# Patient Record
Sex: FEMALE | Employment: PART TIME | URBAN - METROPOLITAN AREA
[De-identification: names, ages, dates, MRNs, and addresses within clinical notes are randomized per-mention and may not be internally consistent; named-entity substitution may affect disease eponyms.]

---

## 2017-02-08 ENCOUNTER — HOSPITAL ENCOUNTER (OUTPATIENT)
Dept: PHYSICAL THERAPY | Age: 47
Discharge: HOME | End: 2017-02-08
Payer: COMMERCIAL

## 2017-02-08 PROCEDURE — 97162 PT EVAL MOD COMPLEX 30 MIN: CPT | Performed by: PHYSICAL THERAPIST

## 2017-02-08 PROCEDURE — 97530 THERAPEUTIC ACTIVITIES: CPT | Performed by: PHYSICAL THERAPIST

## 2017-02-08 NOTE — PROGRESS NOTES
Colette Crow [address] [facility] [facility]  [facility]  [address] [address] ,[address], [address]  Phone: [phone]  Fax: [phone] / [address]  Patient Name: Rommel London : 1970   Medical   Diagnosis: Right-sided low back pain with left-sided sciatica [M54.42] Treatment Diagnosis: LBP with L LE Radiculopathy   Onset Date: 17     Referral Source: More [facility]): 2017   Prior Hospitalization: See medical history Provider #: [id number]   Prior Level of Function: Able to sit at work and do Yoga/Pilates classes without symptoms   Comorbidities: H/o thyroid dysfunction and long h/o recurrent L Hip Pain   Medications: Verified on Patient Summary List   The Plan of Care and following information is based on the information from the initial evaluation.   ===========================================================================================  Assessment / key information:  41 y/o female presents to PT with L LE pain/paraesthesias since injury while carrying grocery bags 2 weeks ago. She reports developing severe left leg pain which limited her ability to bear weight on L LE. A prednisone dose-pack was started 2 days ago and her pain has reduced significantly to 2/10. Her current c/o in numbness into left LE down to her foot. Pt stands with decreased spinal curves in the sagittal plane. She has pain with end range sidebending and extension without any reduction in numbness with repeated assessment. LE strength was WNL, except some left hip flexion and DF showing mild weakness. She has nerve tension symptoms with left SLR, with minimal ROM loss. Pt was only able to achieve relief of LE symptoms with positional distraction of left lumbar spine.   Signs and symptoms suggest resolving left lumbar radiculopathy.  ===========================================================================================  Eval Complexity: History MEDIUM  Complexity : 1-2 comorbidities / personal factors will impact the outcome/ POC ;  Examination  MEDIUM Complexity : 3 Standardized tests and measures addressing body structure, function, activity limitation and / or participation in recreation ; Presentation MEDIUM Complexity : Evolving with changing characteristics ; Decision Making MEDIUM Complexity : FOTO score of 26-74; Overall Complexity MEDIUM  Problem List: pain affecting function, decrease ROM, decrease strength, impaired gait/ balance, decrease ADL/ functional abilitiies, decrease activity tolerance, decrease flexibility/ joint mobility and decrease transfer abilities   Treatment Plan may include any combination of the following: Therapeutic exercise, Therapeutic activities, Neuromuscular re-education, Physical agent/modality, Gait/balance training, Manual therapy, Dry Needling, Aquatic therapy, Patient education, Self Care training, Functional mobility training, Home safety training and Stair training  Patient / Family readiness to learn indicated by: asking questions, trying to perform skills and interest  Persons(s) to be included in education: patient (P)  Barriers to Learning/Limitations: None  Measures taken:    Patient Goal (s): \"strength back/hip/abs to avoid another tear in L5\"   Patient self reported health status: good  Rehabilitation Potential: good   Short Term Goals: To be accomplished in  2  weeks:  1. Pt independent with sitting posture and body mechanics for lifting. 2. Pt able to manage symptoms into left LE with HEP/postural correction.  Long Term Goals: To be accomplished in  6  weeks:  1. Pt to increase FOTO score from 59 to >/= 77.  2. Pt independent with high level HEP for body mechanics and spinal stabilization exercises.   3. Pt to demonstrate full, painfree AROM of her lumbar spine to allow return to all activities. Frequency / Duration:   Patient to be seen  2  times per week for 6  weeks:  Patient / Caregiver education and instruction: self care, activity modification, brace/ splint application and exercises  G-Codes (GP): jeremy  Therapist Signature: Annetta Parker PT Date: 8/0/5516   Certification Period: na Time: 10:22 AM   ===========================================================================================  I certify that the above Physical Therapy Services are being furnished while the patient is under my care. I agree with the treatment plan and certify that this therapy is necessary. Physician Signature:        Date:       Time:     Please sign and return to [facility] or you may fax the signed copy to [phone]. Thank you.

## 2017-02-08 NOTE — PROGRESS NOTES
PHYSICAL THERAPY - DAILY TREATMENT NOTE    Patient Name: Jovan Vo        Date: 2017  : 1970   YES Patient  Verified  Visit #:     Insurance: Payor: Yenifer Obregon / Plan: [email or web address] [facility] / Product Type: PPO /      In time: 10:00 Out time: 11:05   Total Treatment Time: 55     Medicare Time Tracking (below)   Total Timed Codes (min):  na 1:1 Treatment Time:  na     TREATMENT AREA =  Back - L LE Pain    SUBJECTIVE  Pain Level (on 0 to 10 scale):  2  / 10   Medication Changes/New allergies or changes in medical history, any new surgeries or procedures? NO    If yes, update Summary List   Subjective Functional Status/Changes:  []  No changes reported     See eval/POC          OBJECTIVE  Modalities Rationale:     decrease pain and increase tissue extensibility to improve patient's ability to prevent soreness  15 min [x] Estim, type/location: IFC to left lower back - supine after treatment                                     []  att     [x]  unatt     []  w/US     []  w/ice    [x]  w/heat    min []  Mechanical Traction: type/lbs                   []  pro   []  sup   []  int   []  cont    []  before manual    []  after manual    min []  Ultrasound, settings/location:      min []  Iontophoresis w/ dexamethasone, location:                                               []  take home patch       []  in clinic    min []  Ice     []  Heat    location/position:     min []  Vasopneumatic Device, press/temp:     min []  Other:    [] Skin assessment post-treatment (if applicable):    []  intact    []  redness- no adverse reaction     []redness  adverse reaction:          10 min Therapeutic Activity: Review of sitting posture and avoidance of spinal flexion to allow healing. Pt has a lumbar soft corset at [facility] and she was encouraged to wear to help with maintaining neutral spine.         min Neuromuscular Re-ed:         min Gait Training:       min Patient Education:  Nasima Aguilar   [] Progressed/Changed HEP based on: Other Objective/Functional Measures:    FOTO - 59     Post Treatment Pain Level (on 0 to 10) scale:   2  / 10     ASSESSMENT  Assessment/Changes in Function:     Pt has signs and symptoms suggestive of right lumbar radiculopathy     []  See Progress Note/Recertification   Patient will continue to benefit from skilled PT services to modify and progress therapeutic interventions, address functional mobility deficits, address ROM deficits, address strength deficits, analyze and address soft tissue restrictions, analyze and cue movement patterns, analyze and modify body mechanics/ergonomics and assess and modify postural abnormalities to attain remaining goals. Progress toward goals / Updated goals:    Goals established today     PLAN  [x]  Upgrade activities as tolerated YES Continue plan of care   []  Discharge due to :    []  Other:      Therapist: Delaney Coon PT    Date: 2/8/2017 Time: 10:21 AM     No future appointments.

## 2017-02-10 ENCOUNTER — APPOINTMENT (OUTPATIENT)
Dept: PHYSICAL THERAPY | Age: 47
End: 2017-02-10
Payer: COMMERCIAL

## 2017-02-10 ENCOUNTER — HOSPITAL ENCOUNTER (OUTPATIENT)
Dept: PHYSICAL THERAPY | Age: 47
Discharge: HOME | End: 2017-02-10
Payer: COMMERCIAL

## 2017-02-10 PROCEDURE — 97110 THERAPEUTIC EXERCISES: CPT

## 2017-02-10 PROCEDURE — 97014 ELECTRIC STIMULATION THERAPY: CPT

## 2017-02-10 NOTE — PROGRESS NOTES
PHYSICAL THERAPY - DAILY TREATMENT NOTE    Patient Name: Jim         Date: 2/10/2017  : 1970   YES Patient  Verified  Visit #:   2   of   12  Insurance: Payor: [employer] / Plan: [facility] / Product Type: PPO /      In time: 1030 Out time: 1140   Total Treatment Time: 60     Medicare Time Tracking (below)   Total Timed Codes (min):   1:1 Treatment Time:       TREATMENT AREA =  Lumbar back pain with radiculopathy affecting left lower extremity [M54.17]  Left sided sciatica [M54.32]  SUBJECTIVE  Pain Level (on 0 to 10 scale):  2  / 10   Medication Changes/New allergies or changes in medical history, any new surgeries or procedures? NO    If yes, update Summary List   Subjective Functional Status/Changes:  []  No changes reported     I was sore after the eval but have been focusing on sitting in the correct position and sleeping better.  The pain/numbness in my leg is much less       OBJECTIVE  Modalities Rationale:     decrease inflammation, decrease pain and increase tissue extensibility to improve patient's ability to perform functional ADLs  15 min [] Estim, type/location: IFC to L/S in supine                                    []  att     [x]  unatt     []  w/US     []  w/ice    [x]  w/heat    min []  Mechanical Traction: type/lbs                   []  pro   []  sup   []  int   []  cont    []  before manual    []  after manual    min []  Ultrasound, settings/location:      min []  Iontophoresis w/ dexamethasone, location:                                               []  take home patch       []  in clinic    min []  Ice     []  Heat    location/position:     min []  Vasopneumatic Device, press/temp:     min []  Other:    [] Skin assessment post-treatment (if applicable):    []  intact    []  redness- no adverse reaction     []redness  adverse reaction:        45 min Therapeutic Exercise:  [x]  See flow sheet   Rationale:      increase ROM and increase strength to improve the patients ability to regain functional mobility of L/S for sitting and return to daily workouts     min Manual Therapy:    Rationale:      decrease pain, increase ROM, increase tissue extensibility and decrease trigger points to improve the patient's ability to regain full functional mobility     min Therapeutic Activity:    Rationale:    increase strength, improve coordination and increase proprioception to improve the patients ability to      min Neuromuscular Re-ed:    Rationale:    improve coordination, improve balance and increase proprioception to improve the patients ability to      min Gait Training:    Rationale:       min Patient Education:  YES  Reviewed HEP   [x]  Progressed/Changed HEP based on:   Issued written HEP and reviewed     Other Objective/Functional Measures: Added TE per FS  Initial c/o LBP and mm guarding with EDMUNDO, decreased to prone supported on 1 pillow and able to resume EDMUNDO with ease    Mild + L LE neural tension but able to tolerate 90/90 HS stretch with ankle relaxed/PF     Post Treatment Pain Level (on 0 to 10) scale:   1  / 10     ASSESSMENT  Assessment/Changes in Function:     Progressed treatment as appropriate with good patient tolerance     []  See Progress Note/Recertification   Patient will continue to benefit from skilled PT services to modify and progress therapeutic interventions, address functional mobility deficits, address ROM deficits, address strength deficits, analyze and address soft tissue restrictions, analyze and cue movement patterns, analyze and modify body mechanics/ergonomics and assess and modify postural abnormalities to attain remaining goals.    Progress toward goals / Updated goals:    Progressing towards STG2     PLAN  [x]  Upgrade activities as tolerated YES Continue plan of care   []  Discharge due to :    []  Other:      Therapist: Concepcion Watson, PT, DPT, MTC, CMTPT    Date: 2/10/2017 Time: 1:53 PM     Future Appointments  Date Time Provider Blu Reveles   2/14/2017 8:30 AM Joan Spatz, PT [facility] [place]   2/17/2017 10:30 AM Joan Spatz, PT [facility] [place]

## 2017-02-14 ENCOUNTER — HOSPITAL ENCOUNTER (OUTPATIENT)
Dept: PHYSICAL THERAPY | Age: 47
Discharge: HOME | End: 2017-02-14
Payer: COMMERCIAL

## 2017-02-14 PROCEDURE — 97110 THERAPEUTIC EXERCISES: CPT

## 2017-02-14 PROCEDURE — 97014 ELECTRIC STIMULATION THERAPY: CPT

## 2017-02-14 PROCEDURE — 97140 MANUAL THERAPY 1/> REGIONS: CPT

## 2017-02-14 NOTE — PROGRESS NOTES
PHYSICAL THERAPY - DAILY TREATMENT NOTE    Patient Name: Izzy Martin        Date: 2017  : 1970   YES Patient  Verified  Visit #:   3   of   12  Insurance: Payor: Kellee Moon / Plan: [employer] [facility] [employer] / Product Type: PPO /      In time: 830 Out time: 694   Total Treatment Time: 60     Medicare Time Tracking (below)   Total Timed Codes (min):   1:1 Treatment Time:       TREATMENT AREA =  Lumbar back pain with radiculopathy affecting left lower extremity [M54.17]  Left sided sciatica [M54.32]  SUBJECTIVE  Pain Level (on 0 to 10 scale):  1  / 10   Medication Changes/New allergies or changes in medical history, any new surgeries or procedures?     NO    If yes, update Summary List   Subjective Functional Status/Changes:  []  No changes reported     I havent had leg pain for about 5 days but feel so much tightness on the L side above my hip       OBJECTIVE  Modalities Rationale:     decrease inflammation, decrease pain and increase tissue extensibility to improve patient's ability to perform functional ADLs  15 min [] Estim, type/location: IFC to L/S in supine                                    []  att     [x]  unatt     []  w/US     []  w/ice    [x]  w/heat    min []  Mechanical Traction: type/lbs                   []  pro   []  sup   []  int   []  cont    []  before manual    []  after manual    min []  Ultrasound, settings/location:      min []  Iontophoresis w/ dexamethasone, location:                                               []  take home patch       []  in clinic    min []  Ice     []  Heat    location/position:     min []  Vasopneumatic Device, press/temp:     min []  Other:    [] Skin assessment post-treatment (if applicable):    []  intact    []  redness- no adverse reaction     []redness  adverse reaction:        30 min Therapeutic Exercise:  [x]  See flow sheet   Rationale:      increase ROM and increase strength to improve the patients ability to regain functional mobility of L/S for sitting and return to daily workouts    15 min Manual Therapy: Passive QL stretch after needling   Rationale:      decrease pain, increase ROM, increase tissue extensibility and decrease trigger points to improve the patient's ability to regain full functional mobility  Other Objective/Functional Measures:    Dry Needling Procedure Note    Dry Needle Session Number:  1    Procedure: An intramuscular manual therapy (dry needling) and a neuro-muscular re-education treatment was done to deactivate myofascial trigger points, with a 15/30 gauge solid filament needle, under aseptic technique.     Indication(s): [] Muscle spasms          [x] Myalgia/Myositis    [] Muscle cramps                [x] Muscle imbalances     [] TMD (TMJ)             [x] Myofascial pain & dysfunction    [] Other: __    TIMEOUT PERFORMED:  850am (enter time the timeout was completed)   Brandt Pichardo PT and [facility] [place] present    Informed Consent Obtained: [x] Verbal  [x] Written    The following items were reviewed with the patient:   Purpose of dry needling, side effects, possible complications, and the informed consent    The need to report the use of blood thinners and/or immunosuppressant medications    How to respond to possible adverse effects of the treatment   Self treatment of post needling soreness: heat (moist heat, heat wraps) and stretching   Opportunity was given to ask any questions, all questions were answered    Treatment:  The following muscles were treated today:    Right:    Left: QL in R S/L over 1 pillow     Patients response to todays treatment:   [x]  LTRs   []  Muscle Relaxation   []  Pain Relief   []  Decreased Tinnitus  []  Decreased HAs   [x]  Post needling soreness    []  Increased ROM   []  Other:         min Therapeutic Activity:    Rationale:    increase strength, improve coordination and increase proprioception to improve the patients ability to      min Neuromuscular Re-ed:    Rationale: improve coordination, improve balance and increase proprioception to improve the patients ability to      min Gait Training:    Rationale:       min Patient Education:  YES  Reviewed HEP   []  Progressed/Changed HEP based on: Other Objective/Functional Measures:    C/o L sided pain/tightness along superior iliac crest during LTR to R  TrP along L QL  + LTR elicited to muscles to treated muscles with dry needling technique. No adverse reactions from [address]    Improved AROM L/S SB R and dec c/o tightness after MT     Post Treatment Pain Level (on 0 to 10) scale:   1  / 10     ASSESSMENT  Assessment/Changes in Function:     Progressed treatment as appropriate with good patient tolerance to DN     []  See Progress Note/Recertification   Patient will continue to benefit from skilled PT services to modify and progress therapeutic interventions, address functional mobility deficits, address ROM deficits, address strength deficits, analyze and address soft tissue restrictions, analyze and cue movement patterns, analyze and modify body mechanics/ergonomics and assess and modify postural abnormalities to attain remaining goals.    Progress toward goals / Updated goals:    Progressing towards LTGs, met STG 1 and 2     PLAN  [x]  Upgrade activities as tolerated YES Continue plan of care   []  Discharge due to :    []  Other:      Therapist: Ira Medina, PT, DPT, MTC, CMTPT    Date: 2/14/2017 Time: 1:53 PM     Future Appointments  Date Time Provider Blu Reveles   2/17/2017 10:30 AM Hal Cross PT [facility]

## 2017-02-17 ENCOUNTER — HOSPITAL ENCOUNTER (OUTPATIENT)
Dept: PHYSICAL THERAPY | Age: 47
Discharge: HOME | End: 2017-02-17
Payer: COMMERCIAL

## 2017-02-17 PROCEDURE — 97110 THERAPEUTIC EXERCISES: CPT

## 2017-02-17 PROCEDURE — 97140 MANUAL THERAPY 1/> REGIONS: CPT

## 2017-02-17 NOTE — PROGRESS NOTES
PHYSICAL THERAPY - DAILY TREATMENT NOTE    Patient Name: Rommel London        Date: 2017  : 1970   YES Patient  Verified  Visit #:   3   of   12  Insurance: Payor: Radu Oneill / Plan: [employer] [facility] [place] [place] / Product Type: PPO /      In time: 1030 Out time: 1140   Total Treatment Time: 65     Medicare Time Tracking (below)   Total Timed Codes (min):   1:1 Treatment Time:       TREATMENT AREA =  Lumbar back pain with radiculopathy affecting left lower extremity [M54.17]  Left sided sciatica [M54.32]  SUBJECTIVE  Pain Level (on 0 to 10 scale):  2  / 10   Medication Changes/New allergies or changes in medical history, any new surgeries or procedures? NO    If yes, update Summary List   Subjective Functional Status/Changes:  []  No changes reported     I was really sore after the needling.  There are spots in my back and my hip. i had a massage and some places hurt so bad i almost jumped off the table       OBJECTIVE  Modalities Rationale:     decrease inflammation, decrease pain and increase tissue extensibility to improve patient's ability to perform functional ADLs   min [] Estim, type/location:                                     []  att     []  unatt     []  w/US     []  w/ice    []  w/heat    min []  Mechanical Traction: type/lbs                   []  pro   []  sup   []  int   []  cont    []  before manual    []  after manual    min []  Ultrasound, settings/location:      min []  Iontophoresis w/ dexamethasone, location:                                               []  take home patch       []  in clinic   10 min []  Ice     [x]  Heat    location/position: L/S in supine> L hip in S/L    min []  Vasopneumatic Device, press/temp:     min []  Other:    [] Skin assessment post-treatment (if applicable):    []  intact    []  redness- no adverse reaction     []redness  adverse reaction:        40 min Therapeutic Exercise:  [x]  See flow sheet   Rationale:      increase ROM and increase strength to improve the patients ability to regain functional mobility of L/S for sitting and return to daily workouts    15 min Manual Therapy: Progressive L QL TrP release and stretch   Rationale:      decrease pain, increase ROM, increase tissue extensibility and decrease trigger points to improve the patient's ability to regain full functional mobility     min Therapeutic Activity:    Rationale:    increase strength, improve coordination and increase proprioception to improve the patients ability to      min Neuromuscular Re-ed:    Rationale:    improve coordination, improve balance and increase proprioception to improve the patients ability to      min Gait Training:    Rationale:       min Patient Education:  YES  Reviewed HEP   []  Progressed/Changed HEP based on: Other Objective/Functional Measures:    Reviewed anatomy of TrP and explained how to perform self relief with FR  Good release with MT although significant mypfascial pain remained    Added flex/rot stretch at chair for QL, self TrP release of QL, paraspinals, glutes and piriformis with FR     Post Treatment Pain Level (on 0 to 10) scale:   0  / 10     ASSESSMENT  Assessment/Changes in Function:     Progressed treatment as appropriate with good patient tolerance to FR     []  See Progress Note/Recertification   Patient will continue to benefit from skilled PT services to modify and progress therapeutic interventions, address functional mobility deficits, address ROM deficits, address strength deficits, analyze and address soft tissue restrictions, analyze and cue movement patterns, analyze and modify body mechanics/ergonomics and assess and modify postural abnormalities to attain remaining goals.    Progress toward goals / Updated goals:    Progressing towards STG2     PLAN  [x]  Upgrade activities as tolerated YES Continue plan of care   []  Discharge due to :    []  Other:      Therapist: Yannick Douglas, PT, DPT, MTC, CMTPT    Date: 2/17/2017 Time: 1:53 PM     Future Appointments  Date Time Provider Blu Reveles   2/21/2017 1:30 PM Emilia Rod, PT [facility] [place]   2/24/2017 1:00 PM Emilia Rod, PT [facility] [place]

## 2017-02-21 ENCOUNTER — HOSPITAL ENCOUNTER (OUTPATIENT)
Dept: PHYSICAL THERAPY | Age: 47
Discharge: HOME | End: 2017-02-21
Payer: COMMERCIAL

## 2017-02-21 PROCEDURE — 97140 MANUAL THERAPY 1/> REGIONS: CPT

## 2017-02-21 PROCEDURE — 97014 ELECTRIC STIMULATION THERAPY: CPT

## 2017-02-21 PROCEDURE — 97110 THERAPEUTIC EXERCISES: CPT

## 2017-02-21 NOTE — PROGRESS NOTES
PHYSICAL THERAPY - DAILY TREATMENT NOTE    Patient Name: Alanis Combs        Date: 2017  : 1970   YES Patient  Verified  Visit #:   5   of   12  Insurance: Payor: BLUE CROSS / Plan: [employer] [facility] [place] / Product Type: PPO /      In time: 130 Out time: 240   Total Treatment Time: 60     Medicare Time Tracking (below)   Total Timed Codes (min):   1:1 Treatment Time:       TREATMENT AREA =  Lumbar back pain with radiculopathy affecting left lower extremity [M54.17]  Left sided sciatica [M54.32]  SUBJECTIVE  Pain Level (on 0 to 10 scale):  2  / 10   Medication Changes/New allergies or changes in medical history, any new surgeries or procedures?     NO    If yes, update Summary List   Subjective Functional Status/Changes:  []  No changes reported     Still sore on the hip       OBJECTIVE  Modalities Rationale:     decrease inflammation, decrease pain and increase tissue extensibility to improve patient's ability to perform functional ADLs  15 min [x] Estim, type/location: L/S (IFC) in supine                                    []  att     [x]  unatt     []  w/US     []  w/ice    [x]  w/heat    min []  Mechanical Traction: type/lbs                   []  pro   []  sup   []  int   []  cont    []  before manual    []  after manual    min []  Ultrasound, settings/location:      min []  Iontophoresis w/ dexamethasone, location:                                               []  take home patch       []  in clinic    min []  Ice     []  Heat    location/position:     min []  Vasopneumatic Device, press/temp:     min []  Other:    [] Skin assessment post-treatment (if applicable):    []  intact    []  redness- no adverse reaction     []redness  adverse reaction:        30 min Therapeutic Exercise:  [x]  See flow sheet   Rationale:      increase ROM and increase strength to improve the patients ability to regain functional mobility of L/S for sitting and return to daily workouts    15 min Manual Therapy: Progressive L QL TrP release and stretch   Rationale:      decrease pain, increase ROM, increase tissue extensibility and decrease trigger points to improve the patient's ability to regain full functional mobility     min Therapeutic Activity:    Rationale:    increase strength, improve coordination and increase proprioception to improve the patients ability to      min Neuromuscular Re-ed:    Rationale:    improve coordination, improve balance and increase proprioception to improve the patients ability to      min Gait Training:    Rationale:       min Patient Education:  YES  Reviewed HEP   []  Progressed/Changed HEP based on: Other Objective/Functional Measures:    TE per FS     Post Treatment Pain Level (on 0 to 10) scale:   0  / 10     ASSESSMENT  Assessment/Changes in Function:     Progressed treatment as appropriate with good patient tolerance     []  See Progress Note/Recertification   Patient will continue to benefit from skilled PT services to modify and progress therapeutic interventions, address functional mobility deficits, address ROM deficits, address strength deficits, analyze and address soft tissue restrictions, analyze and cue movement patterns, analyze and modify body mechanics/ergonomics and assess and modify postural abnormalities to attain remaining goals.    Progress toward goals / Updated goals:    Progressing towards STG2     PLAN  [x]  Upgrade activities as tolerated YES Continue plan of care   []  Discharge due to :    []  Other:      Therapist: Russell Fernandez, PT, DPT, MTC, CMTPT    Date: 2/21/2017 Time: 4:01 PM     Future Appointments  Date Time Provider Blu Reveles   2/24/2017 1:00 PM Pam Emmanuel PT [facility]

## 2017-02-24 ENCOUNTER — HOSPITAL ENCOUNTER (OUTPATIENT)
Dept: PHYSICAL THERAPY | Age: 47
Discharge: HOME | End: 2017-02-24
Payer: COMMERCIAL

## 2017-02-24 PROCEDURE — 97110 THERAPEUTIC EXERCISES: CPT

## 2017-02-24 PROCEDURE — 97014 ELECTRIC STIMULATION THERAPY: CPT

## 2017-02-24 PROCEDURE — 97140 MANUAL THERAPY 1/> REGIONS: CPT

## 2017-02-27 NOTE — PROGRESS NOTES
PHYSICAL THERAPY - DAILY TREATMENT NOTE    Patient Name: Pebbles Espana        Date: 2017  : 1970   YES Patient  Verified  Visit #:     Insurance: Payor: [place] / Plan: [employer] [facility] / Product Type: PPO /      In time: 100 Out time: 200   Total Treatment Time: 60     Medicare Time Tracking (below)   Total Timed Codes (min):   1:1 Treatment Time:       TREATMENT AREA =  Lumbar back pain with radiculopathy affecting left lower extremity [M54.17]  Left sided sciatica [M54.32]  SUBJECTIVE  Pain Level (on 0 to 10 scale):  0  / 10   Medication Changes/New allergies or changes in medical history, any new surgeries or procedures?     NO    If yes, update Summary List   Subjective Functional Status/Changes:  []  No changes reported       Miriam been stretching more and sitting better and it helps       OBJECTIVE  Modalities Rationale:     decrease inflammation, decrease pain and increase tissue extensibility to improve patient's ability to perform functional ADLs  15 min [x] Estim, type/location: IFC to L/S in supine                                    []  att     [x]  unatt     []  w/US     []  w/ice    [x]  w/heat    min []  Mechanical Traction: type/lbs                   []  pro   []  sup   []  int   []  cont    []  before manual    []  after manual    min []  Ultrasound, settings/location:      min []  Iontophoresis w/ dexamethasone, location:                                               []  take home patch       []  in clinic    min []  Ice     []  Heat    location/position:     min []  Vasopneumatic Device, press/temp:     min []  Other:    [] Skin assessment post-treatment (if applicable):    []  intact    []  redness- no adverse reaction     []redness  adverse reaction:        30 min Therapeutic Exercise:  [x]  See flow sheet   Rationale:      increase ROM and increase strength to improve the patients ability to regain functional mobility of L/S for sitting and return to daily workouts    15 min Manual Therapy: Progressive L QL TrP release and stretch, lumbar roll   Rationale:      decrease pain, increase ROM, increase tissue extensibility and decrease trigger points to improve the patient's ability to regain full functional mobility     min Therapeutic Activity:    Rationale:    increase strength, improve coordination and increase proprioception to improve the patients ability to      min Neuromuscular Re-ed:    Rationale:    improve coordination, improve balance and increase proprioception to improve the patients ability to      min Gait Training:    Rationale:       min Patient Education:  YES  Reviewed HEP   []  Progressed/Changed HEP based on: Other Objective/Functional Measures:    TE per FS     Post Treatment Pain Level (on 0 to 10) scale:   1  / 10     ASSESSMENT  Assessment/Changes in Function:     Progressed treatment as appropriate with good patient tolerance to FR     []  See Progress Note/Recertification   Patient will continue to benefit from skilled PT services to modify and progress therapeutic interventions, address functional mobility deficits, address ROM deficits, address strength deficits, analyze and address soft tissue restrictions, analyze and cue movement patterns, analyze and modify body mechanics/ergonomics and assess and modify postural abnormalities to attain remaining goals. Progress toward goals / Updated goals:    Partially met [place]  [x]  Upgrade activities as tolerated YES Continue plan of care   []  Discharge due to :    []  Other:      Therapist: Jazzy Schroeder, PT, DPT, MTC, CMTPT    Date: 2/24/2017 Time: 1:53 PM     No future appointments.

## 2018-08-01 NOTE — PROGRESS NOTES
[facility] [address]  [facility]  [facility] 
[address] [facility] [address], [address] [address] ,[address], [address]  Phone: [phone]  Fax: [phone] DISCHARGE SUMMARY Patient Name: Keily Jiménez : 1970 Treatment/Medical Diagnosis: Lumbar back pain with radiculopathy affecting left lower extremity [M54.17] Left sided sciatica [M54.32] Referral Source: [place], [place] Date of Initial Visit: 17 Attended Visits: 6 Missed Visits: 0  
 
SUMMARY OF TREATMENT Therapeutic exercises including ROM, strengthening and stretching; manual therapy including joint and soft tissue manipulation, dry needling; postural re-education, modalities: MHP and e-stim, HEP instruction. CURRENT STATUS 
39 y/o female presents to PT with L LE pain/paraesthesias since injury while carrying grocery bags 2 weeks ago. Mrs. Sarkar did not return to PT after last attended visit on 17 and therefore current status is unknown. At last visit she reported 0/10 pain, compliance with HEP and improved postural awareness with sitting at work desk and driving. RECOMMENDATIONS Discontinue therapy. Progressing towards or have reached established goals. If you have any questions/comments please contact us directly at [phone]. Thank you for allowing us to assist in the care of your patient. Therapist Signature: Karyle Jakes, PT, DPT, MTC, CMTPT Date: 2018   Time: 1:08 PM

## 2022-07-27 ENCOUNTER — HOSPITAL ENCOUNTER (OUTPATIENT)
Dept: PHYSICAL THERAPY | Age: 52
Discharge: HOME | End: 2022-07-27
Payer: COMMERCIAL

## 2022-07-27 PROCEDURE — 97112 NEUROMUSCULAR REEDUCATION: CPT

## 2022-07-27 PROCEDURE — 97162 PT EVAL MOD COMPLEX 30 MIN: CPT

## 2022-07-27 PROCEDURE — 97535 SELF CARE MNGMENT TRAINING: CPT

## 2022-07-27 NOTE — PROGRESS NOTES
PHYSICAL THERAPY - DAILY TREATMENT NOTE    Patient Name: March Najjar        Date: 2022  : 1970   YES Patient  Verified  Visit #:      12  Insurance: Payor: Rodrick Valencia / Plan: [employer] [facility] / Product Type: PPO /      In time: 115 Out time: 215   Total Treatment Time: 55     BCBS/Medicare Time Tracking (below)   Total Timed Codes (min):  45 1:1 Treatment Time:  45     TREATMENT AREA =  Neck pain [M54.2]  Right shoulder pain [M25.511]    SUBJECTIVE  Pain Level (on 0 to 10 scale):  2-8  / 10   Medication Changes/New allergies or changes in medical history, any new surgeries or procedures? NO    If yes, update Summary List   Subjective Functional Status/Changes:  []  No changes reported      Patient is a 47 y.o. female who presents to [facility] with complaints of neck pain and R sided numbness in first 3 digits. Modalities Rationale:     decrease edema, decrease inflammation, and decrease pain to improve patient's ability to perform pain-free ADLs.     min [] Estim, type/location:                                      []  att     []  unatt     []  w/US     []  w/ice    []  w/heat    min []  Mechanical Traction: type/lbs                   []  pro   []  sup   []  int   []  cont    []  before manual    []  after manual    min []  Ultrasound, settings/location:      min []  Iontophoresis w/ dexamethasone, location:                                               []  take home patch       []  in clinic   10 min []  Ice     [x]  Heat    location/position: C/s supine    min []  Vasopneumatic Device, press/temp:    If using vaso (only need to measure limb vaso being performed on)      pre-treatment girth :       post-treatment girth :       measured at (landmark location) :      min []  Other:    [x] Skin assessment post-treatment (if applicable):    [x]  intact    [x]  redness- no adverse reaction                  []redness - adverse reaction:        15 min Neuromuscular Re-ed: [x]  See flow sheet   Rationale:    increase ROM, increase strength, improve coordination, and increase proprioception to improve the patients ability to improve postural control      10 min Self Care: Discussed use of cervical roll in pillow while sleeping and lumbar roll while seated at work; discussed ergonomic set up and postural adjustments while at desk   Rationale:    increase ROM, increase strength, improve coordination, and increase proprioception to improve the patients ability to manage symptoms during ADLs. Billed With/As:   [x] TE   [] TA   [] Neuro   [] Self Care Patient Education: [x] Review HEP    [] Progressed/Changed HEP based on:   [] positioning   [] body mechanics   [] transfers   [] heat/ice application    [] other:      Other Objective/Functional Measures:    FOTO 47     Post Treatment Pain Level (on 0 to 10) scale:   3  / 10     ASSESSMENT  Assessment/Changes in Function:     See POC     []  See Progress Note/Recertification   Patient will continue to benefit from skilled PT services to modify and progress therapeutic interventions, address functional mobility deficits, address ROM deficits, address strength deficits, analyze and address soft tissue restrictions, analyze and cue movement patterns, analyze and modify body mechanics/ergonomics, and assess and modify postural abnormalities to attain remaining goals. Progress toward goals / Updated goals:    See POC for new short and long term goals. PLAN  [x]  Upgrade activities as tolerated YES Continue plan of care   []  Discharge due to :    []  Other:      Therapist: Nayeli Tao DPT    Date: 7/27/2022 Time: 2:17 PM     No future appointments.

## 2022-07-27 NOTE — PROGRESS NOTES
[address] [place] [address] PHYSICAL THERAPY AT [address] [place] [address] [facility], [address] [place] [address], [address]  Phone: [phone]  Fax: [phone] / [address] [address] PHYSICAL THERAPY SERVICES  Patient Name: Zenaida Arellano : 1970   Medical   Diagnosis: Neck pain [M54.2]  Right shoulder pain [M25.511] Treatment Diagnosis: Neck pain with R side radiculopathy   Onset Date: 1 month prior to eval     Referral Source: Pat Miranda [facility]): 2022   Prior Hospitalization: See medical history Provider #: [id number]   Prior Level of Function: 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   ===========================================================================================  Assessment / key information:  Patient is a 47 y.o. female who presents to [facility] with complaints of neck pain and R sided numbness in first 3 digits. Patient reports onset of symptoms 1 month ago while working at computer with hand on mouse. Symptoms have been constant since onset but fluctuate in intensity; pain ranges from 2-8/10 with exacerbations immediately when typing, drawing or looking up. X-rays revealed dec disc space at C5-C6. Pt reports improvement in symptoms with ice and breathing techniques to improve muscle relaxation. Pt reports recent onset of clumsiness/weakness of R hand when tingles are strong; headaches are nearly constant while at work. Pt presents today with overcorrected upright posture with overactive muscle recruitment; some swelling noted over R sternoclavicular joint.     Patient presents with the following:  AROM:c/s flex 100% with stretching sensation; ext 0% pain and tingling into RUE, B rot 25%, B SB 25% with stretching reported, poor scapulohumeral rhythm noted with OH shoulder movement  PROM:unable to assess due to significant muscle guarding  MMT:chin tuck caused significant tension in suboccipitals, UT and mid-thoracic region; BUE MMT 5/5 although excessive UT recruitment was noted with each ms test  other: swelling noted in R clavicle, poor PIVM throughout C/s, TP noted in rhomboids, MT, UT, suboccipitals and SCM    Patient sings and sx are consistent with c/s pain with R sided radiculopathy due to underlying postural dysfunction. An initial HEP was provided to address above deficits. Physical Therapy services will be beneficial in order to decrease pain, improve ROM, strength and postural control in order to resume work-related demands and return to PLOF.  ===========================================================================================  Eval Complexity: History MEDIUM  Complexity : 1-2 comorbidities / personal factors will impact the outcome/ POC ;  Examination  MEDIUM Complexity : 3 Standardized tests and measures addressing body structure, function, activity limitation and / or participation in recreation ; Presentation MEDIUM Complexity : Evolving with changing characteristics ;   Decision Making MEDIUM Complexity : FOTO score of 26-74; Overall Complexity MEDIUM  Problem List: pain affecting function, decrease ROM, decrease strength, edema affecting function, impaired gait/ balance, decrease ADL/ functional abilitiies, decrease activity tolerance, and decrease flexibility/ joint mobility   Treatment Plan may include any combination of the following: Therapeutic exercise, Therapeutic activities, Neuromuscular re-education, Physical agent/modality, Gait/balance training, Manual therapy, Patient education, Self Care training, and Functional mobility training  Patient / Family readiness to learn indicated by: asking questions, trying to perform skills, and interest  Persons(s) to be included in education: patient (P)  Barriers to Learning/Limitations: None  Measures taken, if barriers to learning:    Patient Goal (s): \"Ease of nerve tingles\"   Patient self reported health status: good  Rehabilitation Potential: good  Short Term Goals: To be accomplished in  3  weeks:  Patient will be independent and compliant with initial HEP. Patient will report decreased pain levels to 0/10 in order to sleep through the night pain-free  Demonstrate ability to perform 2x10 AO nods without compensations in order to indicate improved postural control. Long Term Goals: To be accomplished in  6  weeks:  Patient will be independent and compliant with progressive HEP for postural control in order to maintain gains made with physical therapy  Improve FOTO score from 47 to > or = 61 in order to indicate improved postural control  Report ability to tolerate 8 hour workday with pain remaining below 2/10 in order to indicate dec muscle guarding while seated. Report dec incidence of tingling to 1-2x/week in order to indicate improved postural control. Frequency / Duration:   Patient to be seen  2  times per week for 4-6  weeks:  Patient / Caregiver education and instruction: self care, activity modification, and exercises  Therapist Signature: Ester Ahn DPT Date: 4/69/0192   Certification Period: NA Time: 1:21 PM   ===========================================================================================  I certify that the above Physical Therapy Services are being furnished while the patient is under my care. I agree with the treatment plan and certify that this therapy is necessary. Physician Signature:        Date:       Time:     Please sign and return to [facility] or you may fax the signed copy to [phone]. Thank you.

## 2022-07-29 ENCOUNTER — HOSPITAL ENCOUNTER (OUTPATIENT)
Dept: PHYSICAL THERAPY | Age: 52
Discharge: HOME | End: 2022-07-29
Payer: COMMERCIAL

## 2022-07-29 PROCEDURE — 97112 NEUROMUSCULAR REEDUCATION: CPT

## 2022-07-29 PROCEDURE — 97140 MANUAL THERAPY 1/> REGIONS: CPT

## 2022-07-29 PROCEDURE — 97110 THERAPEUTIC EXERCISES: CPT

## 2022-07-29 NOTE — PROGRESS NOTES
PHYSICAL THERAPY - DAILY TREATMENT NOTE    Patient Name: Kaylene Gonzalez        Date: 2022  : 1970   YES Patient  Verified  Visit #:   2   of   12  Insurance: Payor: BLUE CROSS / [facility] / Product Type: PPO /      In time: 115 Out time: 210   Total Treatment Time: 55     BCBS/Medicare Time Tracking (below)   Total Timed Codes (min):  45 1:1 Treatment Time:  45     TREATMENT AREA =  Neck pain [M54.2]  Right shoulder pain [M25.511]    SUBJECTIVE  Pain Level (on 0 to 10 scale):  4  / 10   Medication Changes/New allergies or changes in medical history, any new surgeries or procedures? NO    If yes, update Summary List   Subjective Functional Status/Changes:  []  No changes reported     The initial strengthening exercises have helped me a lot, my tingles have decreased in intensity and when I stretch I feel the tension leave my shoulders. Modalities Rationale:     decrease edema, decrease inflammation, and decrease pain to improve patient's ability to perform pain-free ADLs.     min [] Estim, type/location:                                      []  att     []  unatt     []  w/US     []  w/ice    []  w/heat    min []  Mechanical Traction: type/lbs                   []  pro   []  sup   []  int   []  cont    []  before manual    []  after manual    min []  Ultrasound, settings/location:      min []  Iontophoresis w/ dexamethasone, location:                                               []  take home patch       []  in clinic   10 min []  Ice     [x]  Heat    location/position: C/s and T/s supine    min []  Vasopneumatic Device, press/temp:    If using vaso (only need to measure limb vaso being performed on)      pre-treatment girth :       post-treatment girth :       measured at (landmark location) :      min []  Other:    [x] Skin assessment post-treatment (if applicable):    [x]  intact    [x]  redness- no adverse reaction                  []redness - adverse reaction: 15 min Therapeutic Exercise:  [x]  See flow sheet   Rationale:      increase ROM, increase strength, improve coordination, and increase proprioception to improve the patients ability to perform unlimited ADLs. 15 min Manual Therapy: STM/MFR B suboccipitals, UT and Mid traps, gentle SOR   Rationale:      decrease pain, increase ROM, and increase tissue extensibility to improve patient's ability to decrease incidence of headache  The manual therapy interventions were performed at a separate and distinct time from the therapeutic activities interventions. 15 min Neuromuscular Re-ed: [x]  See flow sheet   Rationale:    increase ROM, increase strength, and improve coordination to improve the patients ability to improve postural control      Billed With/As:   [x] TE   [] TA   [] Neuro   [] Self Care Patient Education: [x] Review HEP    [] Progressed/Changed HEP based on:   [] positioning   [] body mechanics   [] transfers   [] heat/ice application    [] other:      Other Objective/Functional Measures:    Initiated TE per FS     Post Treatment Pain Level (on 0 to 10) scale:   1  / 10     ASSESSMENT  Assessment/Changes in Function:     Good tolerance to initial session with reports of dec headache following manual treatment. PT required cues throughout to prevent excessive UT compensations. []  See Progress Note/Recertification   Patient will continue to benefit from skilled PT services to modify and progress therapeutic interventions, address functional mobility deficits, address ROM deficits, address strength deficits, analyze and address soft tissue restrictions, analyze and cue movement patterns, analyze and modify body mechanics/ergonomics, and assess and modify postural abnormalities to attain remaining goals. Progress toward goals / Updated goals:    Progressing towards STG 3.       PLAN  [x]  Upgrade activities as tolerated YES Continue plan of care   []  Discharge due to :    []  Other: Therapist: Cherie Dumont DPT    Date: 7/29/2022 Time: 1:43 PM     Future Appointments   Date Time Provider Blu Reveles   8/2/2022  3:00 PM Delkimberlyn Sincinda, PT [facility]   8/5/2022  3:30 PM Delpha Sincinda, PT [facility]   8/8/2022 12:30 PM Delpha Sincinda, PT [facility]   8/12/2022  2:45 PM Delpha Sincinda, PT [facility]   8/15/2022  3:30 PM Delpha Sincinda, PT [facility]

## 2022-08-02 ENCOUNTER — HOSPITAL ENCOUNTER (OUTPATIENT)
Dept: PHYSICAL THERAPY | Age: 52
Discharge: HOME | End: 2022-08-02
Payer: COMMERCIAL

## 2022-08-02 PROCEDURE — 97140 MANUAL THERAPY 1/> REGIONS: CPT

## 2022-08-02 PROCEDURE — 97110 THERAPEUTIC EXERCISES: CPT

## 2022-08-02 PROCEDURE — 97014 ELECTRIC STIMULATION THERAPY: CPT

## 2022-08-02 PROCEDURE — 97112 NEUROMUSCULAR REEDUCATION: CPT

## 2022-08-02 NOTE — PROGRESS NOTES
PHYSICAL THERAPY - DAILY TREATMENT NOTE    Patient Name: Trista Thakkar        Date: 2022  : 1970   YES Patient  Verified  Visit #:   3   of   12  Insurance: Payor: [employer] / Plan: [employer] [facility] / Product Type: PPO /      In time: 300 Out time: 405   Total Treatment Time: 60     BCBS/Medicare Time Tracking (below)   Total Timed Codes (min):  45 1:1 Treatment Time:  45     TREATMENT AREA =  Neck pain [M54.2]  Right shoulder pain [M25.511]    SUBJECTIVE  Pain Level (on 0 to 10 scale):  5-6  10   Medication Changes/New allergies or changes in medical history, any new surgeries or procedures? NO    If yes, update Summary List   Subjective Functional Status/Changes:  []  No changes reported     I have been working nonstop my my home ultrasound and estim machine. I did ultrasound a few times over my pecs where I usually feel the stretches and tried estim on the front of my neck. Modalities Rationale:     decrease edema, decrease inflammation, and decrease pain to improve patient's ability to perform pain-free ADLs.    15 min [x] Estim, type/location: IFC c/s and T/s supine                                     []  att     [x]  unatt     []  w/US     []  w/ice    [x]  w/heat    min []  Mechanical Traction: type/lbs                   []  pro   []  sup   []  int   []  cont    []  before manual    []  after manual    min []  Ultrasound, settings/location:      min []  Iontophoresis w/ dexamethasone, location:                                               []  take home patch       []  in clinic    min []  Ice     []  Heat    location/position: C/s and T/s supine    min []  Vasopneumatic Device, press/temp:    If using vaso (only need to measure limb vaso being performed on)      pre-treatment girth :       post-treatment girth :       measured at (landmark location) :      min []  Other:    [x] Skin assessment post-treatment (if applicable):    [x]  intact    [x]  redness- no adverse reaction                  []redness - adverse reaction:        15 min Therapeutic Exercise:  [x]  See flow sheet   Rationale:      increase ROM, increase strength, improve coordination, and increase proprioception to improve the patients ability to perform unlimited ADLs. 15 min Manual Therapy: STM/MFR B suboccipitals, UT and Mid traps, gentle SOR   Rationale:      decrease pain, increase ROM, and increase tissue extensibility to improve patient's ability to decrease incidence of headache  The manual therapy interventions were performed at a separate and distinct time from the therapeutic activities interventions. 15 min Neuromuscular Re-ed: [x]  See flow sheet   Rationale:    increase ROM, increase strength, and improve coordination to improve the patients ability to improve postural control          Billed With/As:   [x] TE   [] TA   [] Neuro   [] Self Care Patient Education: [x] Review HEP    [] Progressed/Changed HEP based on:   [] positioning   [] body mechanics   [] transfers   [] heat/ice application    [] other: extensive education on contraindicated areas of the body to avoid placing home estim and ultrasound units to protect neurovascular structures that run superficial. Pt was educated to dec stretching frequency to 1-2x/daily not consistently throughout the day to allow for correct rest time in between higher levels of exercise than previously experienced. Also reviewed use of estim to prevent muscle contraction over treatment area as pt had     Other Objective/Functional Measures: Added T spine ext over 1/2 FR; held some pec stretching and Tband row due to increase in radicular symptoms. Post Treatment Pain Level (on 0 to 10) scale:   2  / 10     ASSESSMENT  Assessment/Changes in Function:     Improvement in symptoms with today's session although pt was unable to tolerate any pec major/minor stretching due to significant increase in radicular symptoms.  Pt had good understanding of contraindicated area and was able to achieve level on Estim that did not cause involuntary contractions. []  See Progress Note/Recertification   Patient will continue to benefit from skilled PT services to modify and progress therapeutic interventions, address functional mobility deficits, address ROM deficits, address strength deficits, analyze and address soft tissue restrictions, analyze and cue movement patterns, analyze and modify body mechanics/ergonomics, and assess and modify postural abnormalities to attain remaining goals. Progress toward goals / Updated goals:    Progressing towards STG 3.       PLAN  [x]  Upgrade activities as tolerated YES Continue plan of care   []  Discharge due to :    []  Other:      Therapist: Sahra Bunn DPT    Date: 8/2/2022 Time: 1:43 PM     Future Appointments   Date Time Provider Blu Reveles   8/2/2022  3:00 PM Em Wyman, PT [facility]   8/5/2022  3:30 PM Em Wyman, PT [facility]   8/8/2022 12:30 PM Em Wyman, PT [facility]   8/12/2022  2:45 PM Em Wyman, PT [facility]   8/15/2022  3:30 PM Em Wyman, PT [facility]

## 2022-08-05 ENCOUNTER — HOSPITAL ENCOUNTER (OUTPATIENT)
Dept: PHYSICAL THERAPY | Age: 52
Discharge: HOME | End: 2022-08-05
Payer: COMMERCIAL

## 2022-08-05 PROCEDURE — 97014 ELECTRIC STIMULATION THERAPY: CPT

## 2022-08-05 PROCEDURE — 97140 MANUAL THERAPY 1/> REGIONS: CPT

## 2022-08-05 PROCEDURE — 97110 THERAPEUTIC EXERCISES: CPT

## 2022-08-05 PROCEDURE — 97112 NEUROMUSCULAR REEDUCATION: CPT

## 2022-08-05 NOTE — PROGRESS NOTES
PHYSICAL THERAPY - DAILY TREATMENT NOTE    Patient Name: Polo Covarrubias        Date: 2022  : 1970   YES Patient  Verified  Visit #:   4   of   12  Insurance: Payor: BLUE CROSS / Plan: [employer] [facility] / Product Type: PPO /      In time: 325 Out time: 425   Total Treatment Time: 60     BCBS/Medicare Time Tracking (below)   Total Timed Codes (min):  45 1:1 Treatment Time:  45     TREATMENT AREA =  Neck pain [M54.2]  Right shoulder pain [M25.511]    SUBJECTIVE  Pain Level (on 0 to 10 scale):  1-2 / 10   Medication Changes/New allergies or changes in medical history, any new surgeries or procedures? NO    If yes, update Summary List   Subjective Functional Status/Changes:  []  No changes reported     I have stopped using my home estim and home ultrasound unit. I was able to draw for 4 hours today without experiencing any tingling. Modalities Rationale:     decrease edema, decrease inflammation, and decrease pain to improve patient's ability to perform pain-free ADLs.    15 min [x] Estim, type/location: IFC c/s and T/s supine                                      []  att     [x]  unatt     []  w/US     []  w/ice    [x]  w/heat    min []  Mechanical Traction: type/lbs                   []  pro   []  sup   []  int   []  cont    []  before manual    []  after manual    min []  Ultrasound, settings/location:      min []  Iontophoresis w/ dexamethasone, location:                                               []  take home patch       []  in clinic    min []  Ice     [x]  Heat    location/position:     min []  Vasopneumatic Device, press/temp:    If using vaso (only need to measure limb vaso being performed on)      pre-treatment girth :       post-treatment girth :       measured at (landmark location) :      min []  Other:    [x] Skin assessment post-treatment (if applicable):    [x]  intact    [x]  redness- no adverse reaction                  []redness - adverse reaction:        15 min Therapeutic Exercise:  [x]  See flow sheet   Rationale:      increase ROM, increase strength, improve coordination, and increase proprioception to improve the patients ability to perform unlimited ADLs. 15 min Manual Therapy: STM/MFR B suboccipitals, UT and Mid traps, gentle SOR   Rationale:      decrease pain, increase ROM, and increase tissue extensibility to improve patient's ability to decrease incidence of headache  The manual therapy interventions were performed at a separate and distinct time from the therapeutic activities interventions. 15 min Neuromuscular Re-ed: [x]  See flow sheet   Rationale:    increase ROM, increase strength, and improve coordination to improve the patients ability to improve postural control          Billed With/As:   [x] TE   [] TA   [] Neuro   [] Self Care Patient Education: [x] Review HEP    [] Progressed/Changed HEP based on:   [] positioning   [] body mechanics   [] transfers   [] heat/ice application    [] other:     Other Objective/Functional Measures:    Performed sup over FR on 1/2, added seated UT and levator stretch and resumed Tband rows     Post Treatment Pain Level (on 0 to 10) scale:   1 / 10     ASSESSMENT  Assessment/Changes in Function:     Improved tolerance to treatment today with minimal reports of tingling sensation. Pt reports less stiffness throughout c/s following session and was able to feel release of tension with self stretches. []  See Progress Note/Recertification   Patient will continue to benefit from skilled PT services to modify and progress therapeutic interventions, address functional mobility deficits, address ROM deficits, address strength deficits, analyze and address soft tissue restrictions, analyze and cue movement patterns, analyze and modify body mechanics/ergonomics, and assess and modify postural abnormalities to attain remaining goals. Progress toward goals / Updated goals:    Progressing towards STG 3.       PLAN  [x] Upgrade activities as tolerated YES Continue plan of care   []  Discharge due to :    []  Other:      Therapist: Katia Willett DPT    Date: 8/5/2022 Time: 1:43 PM     Future Appointments   Date Time Provider Blu Reveles   8/5/2022  3:30 PM Denzil Mcburney, PT [facility]   8/8/2022 12:30 PM Denzil Mcburney, PT [facility]   8/12/2022  2:45 PM Denzil Mcburney, PT [facility]   8/15/2022  3:30 PM Denzil Mcburney, PT [facility]

## 2022-08-08 ENCOUNTER — HOSPITAL ENCOUNTER (OUTPATIENT)
Dept: PHYSICAL THERAPY | Age: 52
Discharge: HOME | End: 2022-08-08
Payer: COMMERCIAL

## 2022-08-08 PROCEDURE — 97112 NEUROMUSCULAR REEDUCATION: CPT

## 2022-08-08 PROCEDURE — 97140 MANUAL THERAPY 1/> REGIONS: CPT

## 2022-08-08 PROCEDURE — 97110 THERAPEUTIC EXERCISES: CPT

## 2022-08-08 PROCEDURE — 97014 ELECTRIC STIMULATION THERAPY: CPT

## 2022-08-08 NOTE — PROGRESS NOTES
PHYSICAL THERAPY - DAILY TREATMENT NOTE    Patient Name: Lucía Escobedo        Date: 2022  : 1970   YES Patient  Verified  Visit #:     Insurance: Payor: BLUE CROSS / Plan: [employer] [facility] / Product Type: PPO /      In time: 1230 Out time: 130   Total Treatment Time: 60     BCBS/Medicare Time Tracking (below)   Total Timed Codes (min):  60 1:1 Treatment Time:  45     TREATMENT AREA =  Neck pain [M54.2]  Right shoulder pain [M25.511]    SUBJECTIVE  Pain Level (on 0 to 10 scale):  4 / 10   Medication Changes/New allergies or changes in medical history, any new surgeries or procedures? NO    If yes, update Summary List   Subjective Functional Status/Changes:  []  No changes reported     I was able to draw this weekend without any tingling. Tingles are strong this morning because I had to vacuum the house; it got stronger as I continued. Modalities Rationale:     decrease edema, decrease inflammation, and decrease pain to improve patient's ability to perform pain-free ADLs.    15 min [x] Estim, type/location: IFC c/s and T/s supine                                      []  att     [x]  unatt     []  w/US     []  w/ice    [x]  w/heat    min []  Mechanical Traction: type/lbs                   []  pro   []  sup   []  int   []  cont    []  before manual    []  after manual    min []  Ultrasound, settings/location:      min []  Iontophoresis w/ dexamethasone, location:                                               []  take home patch       []  in clinic    min []  Ice     [x]  Heat    location/position:     min []  Vasopneumatic Device, press/temp:    If using vaso (only need to measure limb vaso being performed on)      pre-treatment girth :       post-treatment girth :       measured at (landmark location) :      min []  Other:    [x] Skin assessment post-treatment (if applicable):    [x]  intact    [x]  redness- no adverse reaction                  []redness - adverse reaction: 15 min Therapeutic Exercise:  [x]  See flow sheet   Rationale:      increase ROM, increase strength, improve coordination, and increase proprioception to improve the patients ability to perform unlimited ADLs. 15 min Manual Therapy: STM/MFR B suboccipitals, UT and Mid traps, gentle SOR, grade 2 inferior mobs to sternoclavicular joint, grade 2 inferior mobs to 1st rib   Rationale:      decrease pain, increase ROM, and increase tissue extensibility to improve patient's ability to decrease incidence of headache  The manual therapy interventions were performed at a separate and distinct time from the therapeutic activities interventions. 15 min Neuromuscular Re-ed: [x]  See flow sheet   Rationale:    increase ROM, increase strength, and improve coordination to improve the patients ability to improve postural control      Billed With/As:   [x] TE   [] TA   [] Neuro   [] Self Care Patient Education: [x] Review HEP    [] Progressed/Changed HEP based on:   [] positioning   [] body mechanics   [] transfers   [] heat/ice application    [] other:     Other Objective/Functional Measures: Added dowel flexion with empahsis on maintinging scapulothoracic control     Post Treatment Pain Level (on 0 to 10) scale:   2 / 10     ASSESSMENT  Assessment/Changes in Function:     Pt had significant restrictions noted in both SCJ and first rib; some discomfort noted with mobilizations but shoulder flexion ROM improved following treatment. Pt is showing improved independence with retraction motion and reports dec radiculopathy with performance.       []  See Progress Note/Recertification   Patient will continue to benefit from skilled PT services to modify and progress therapeutic interventions, address functional mobility deficits, address ROM deficits, address strength deficits, analyze and address soft tissue restrictions, analyze and cue movement patterns, analyze and modify body mechanics/ergonomics, and assess and modify postural abnormalities to attain remaining goals. Progress toward goals / Updated goals:    Progressing towards STG 3.       PLAN  [x]  Upgrade activities as tolerated YES Continue plan of care   []  Discharge due to :    []  Other:      Therapist: Teressa Guerra DPT    Date: 8/8/2022 Time: 1:43 PM     Future Appointments   Date Time Provider Blu Reveles   8/12/2022  2:45 PM Kimani Ferrara, PT [facility]   8/15/2022  3:30 PM Kimani Ferrara, PT [facility]

## 2022-08-12 ENCOUNTER — APPOINTMENT (OUTPATIENT)
Dept: PHYSICAL THERAPY | Age: 52
End: 2022-08-12
Payer: COMMERCIAL

## 2022-08-15 ENCOUNTER — HOSPITAL ENCOUNTER (OUTPATIENT)
Dept: PHYSICAL THERAPY | Age: 52
Discharge: HOME | End: 2022-08-15
Payer: COMMERCIAL

## 2022-08-15 PROCEDURE — 97110 THERAPEUTIC EXERCISES: CPT

## 2022-08-15 PROCEDURE — 97140 MANUAL THERAPY 1/> REGIONS: CPT

## 2022-08-15 PROCEDURE — 97014 ELECTRIC STIMULATION THERAPY: CPT

## 2022-08-15 PROCEDURE — 97112 NEUROMUSCULAR REEDUCATION: CPT

## 2022-08-15 NOTE — PROGRESS NOTES
[address] [place] [address] PHYSICAL THERAPY AT [address] [place] [address] Lyssa Plass [address], [address] [place] [address], [address]  Phone: [phone]  Fax: [phone]  PROGRESS NOTE  Patient Name: Kaley Mejia : 1970   Treatment/Medical Diagnosis: Neck pain [M54.2]  Right shoulder pain [M25.511]   Referral Source: [facility],*     Date of Initial Visit: 2022 Attended Visits: 6 Missed Visits: 1     SUMMARY OF TREATMENT  Postural education, NM re-education of scapular stabilizers and deep neck flexors, stretching and strengthening of B shoulders, AROM of C/s and T/s, HEP prescription, manual therapy to improve mobility, IFC and heat to dec trigger points  CURRENT STATUS  Ms. Roxie Kay has been seen for 5 follow up visits and is demonstrating slow but steady gains with PT Rx. Pt was showing fluctuating progress initially, reporting significant increase in symptoms after using home ultrasound unit on anterior pec region. She was encouraged not to repeat this and educated on areas to avoid with inc neurovascular structures. Pt is making improvements in postural control and independence with scapular retraction but continues to show overp-corrective posture and has difficulty removing tension while in resting positions. Pt is able to use stretches in order to prevent tingling from occurring while working or drawing. Pt received a 90 min deep tissue massage on Monday and reports this improved her symptoms the most; since she has had no tingling into R hand. See below for objective measures:     Goal/Measure of Progress Goal Met? 1. Patient will be independent and compliant with initial HEP. Status at last Eval: - Current Status: Independent and compliant daily yes   2. Patient will report decreased pain levels to 0/10 in order to sleep through the night pain-free   Status at last Eval: 2/10 at best  8/10 at worst Current Status: 0/10 at best  2/10 at worst  Partially met   3. Demonstrate ability to perform 2x10 AO nods without compensations in order to indicate improved postural control. Status at last Eval: - Current Status: 2x10 with good form, continues to report tension down to mid-scapular region yes     New Goals to be achieved in __4__  weeks:  1. Patient will be independent and compliant with progressive HEP for postural control in order to maintain gains made with physical therapy   2. Improve FOTO score from 47 to > or = 61 in order to indicate improved postural control   3. Report ability to tolerate 8 hour workday with pain remaining below 2/10 in order to indicate dec muscle guarding while seated. 4.  Report dec incidence of tingling to 1-2x/week in order to indicate improved postural control. RECOMMENDATIONS  Continue with skilled PT services 2x/week for 4 weeks to progress towards LTG's and improve independence with postural adjustments. Thank you! If you have any questions/comments please contact us directly at [phone]. Thank you for allowing us to assist in the care of your patient. Therapist Signature: Teressa Guerra DPT Date: 8/15/2022     Time: 3:56 PM   NOTE TO PHYSICIAN:  PLEASE COMPLETE THE ORDERS BELOW AND FAX TO   [facility] Physical [facility]: [phone]. If you are unable to process this request in 24 hours please contact our office: [phone].    ___ I have read the above report and request that my patient continue as recommended.   ___ I have read the above report and request that my patient continue therapy with the following changes/special instructions:_________________________________________________________   ___ I have read the above report and request that my patient be discharged from therapy.      Physician Signature:        Date:       Time:

## 2022-08-15 NOTE — PROGRESS NOTES
PHYSICAL THERAPY - DAILY TREATMENT NOTE    Patient Name: Devang Hdez        Date: 8/15/2022  : 1970   YES Patient  Verified  Visit #:      12  Insurance: Payor: BLUE CROSS / Plan: [employer] [facility] / Product Type: PPO /      In time: 330 Out time: 435   Total Treatment Time: 60     BCBS/Medicare Time Tracking (below)   Total Timed Codes (min):  60 1:1 Treatment Time:  45     TREATMENT AREA =  Neck pain [M54.2]  Right shoulder pain [M25.511]    SUBJECTIVE  Pain Level (on 0 to 10 scale):  0 / 10   Medication Changes/New allergies or changes in medical history, any new surgeries or procedures? NO    If yes, update Summary List   Subjective Functional Status/Changes:  []  No changes reported     I had a massage last week and then the chiropractor the next day and my symptoms improved a lot. I havent had any tingling since and feel that I am not clenching when I sleep. Modalities Rationale:     decrease edema, decrease inflammation, and decrease pain to improve patient's ability to perform pain-free ADLs.    15 min [x] Estim, type/location: IFC c/s and T/s supine                                      []  att     [x]  unatt     []  w/US     []  w/ice    [x]  w/heat    min []  Mechanical Traction: type/lbs                   []  pro   []  sup   []  int   []  cont    []  before manual    []  after manual    min []  Ultrasound, settings/location:      min []  Iontophoresis w/ dexamethasone, location:                                               []  take home patch       []  in clinic    min []  Ice     [x]  Heat    location/position:     min []  Vasopneumatic Device, press/temp:    If using vaso (only need to measure limb vaso being performed on)      pre-treatment girth :       post-treatment girth :       measured at (landmark location) :      min []  Other:    [x] Skin assessment post-treatment (if applicable):    [x]  intact    [x]  redness- no adverse reaction []redness - adverse reaction:        15 min Therapeutic Exercise:  [x]  See flow sheet   Rationale:      increase ROM, increase strength, improve coordination, and increase proprioception to improve the patients ability to perform unlimited ADLs. 15 min Manual Therapy: STM/MFR B suboccipitals, UT and Mid traps, gentle SOR, grade 2 inferior mobs to sternoclavicular joint, grade 2 inferior mobs to 1st rib   Rationale:      decrease pain, increase ROM, and increase tissue extensibility to improve patient's ability to decrease incidence of headache  The manual therapy interventions were performed at a separate and distinct time from the therapeutic activities interventions. 15 min Neuromuscular Re-ed: [x]  See flow sheet   Rationale:    increase ROM, increase strength, and improve coordination to improve the patients ability to improve postural control      Billed With/As:   [x] TE   [] TA   [] Neuro   [] Self Care Patient Education: [x] Review HEP    [] Progressed/Changed HEP based on:   [] positioning   [] body mechanics   [] transfers   [] heat/ice application    [] other:     Other Objective/Functional Measures:    See PN to MD     Post Treatment Pain Level (on 0 to 10) scale:   0 / 10     ASSESSMENT  Assessment/Changes in Function:     See PN to MD     []  See Progress Note/Recertification   Patient will continue to benefit from skilled PT services to modify and progress therapeutic interventions, address functional mobility deficits, address ROM deficits, address strength deficits, analyze and address soft tissue restrictions, analyze and cue movement patterns, analyze and modify body mechanics/ergonomics, and assess and modify postural abnormalities to attain remaining goals. Progress toward goals / Updated goals:    See PN for progress towards goals.       PLAN  [x]  Upgrade activities as tolerated YES Continue plan of care   []  Discharge due to :    []  Other:      Therapist: Erinn Peterson DPT Date: 8/15/2022 Time: 1:43 PM     No future appointments.

## 2022-10-03 NOTE — PROGRESS NOTES
[address] [place] [address] PHYSICAL THERAPY AT [address] [place] [address] Lyssa [facility], [address] [place] [address], [address]  Phone: [phone]  Fax: [phone] SUMMARY  Patient Name: Ana M Cuba : 1970   Treatment/Medical Diagnosis: Neck pain [M54.2]  Right shoulder pain [M25.511]   Referral Source: [facility],*     Date of Initial Visit: 2022 Attended Visits: 6 Missed Visits: 1     SUMMARY OF TREATMENT  Postural education, NM re-education of scapular stabilizers and deep neck flexors, stretching and strengthening of B shoulders, AROM of C/s and T/s, HEP prescription, manual therapy to improve mobility, IFC and heat to dec trigger points  CURRENT STATUS  Ms. Glory Heath was seen for 5 follow up visits and was demonstrating slow but steady gains with PT Rx. Pt was last seen on 8/15/2022 when most recent progress note was written. She has not returned to PT since last visit, so formal reassessment is unable to be performed. RECOMMENDATIONS  Discontinue therapy due to lack of attendance or compliance. If you have any questions/comments please contact us directly at [phone]. Thank you for allowing us to assist in the care of your patient.     Therapist Signature: Monika Hartman PT Date: 10/3/2022     Time: 9:58 AM

## 2025-03-13 ENCOUNTER — HOSPITAL ENCOUNTER (OUTPATIENT)
Facility: HOSPITAL | Age: 55
Setting detail: RECURRING SERIES
Discharge: HOME | End: 2025-03-16
Payer: COMMERCIAL

## 2025-03-13 PROCEDURE — 97162 PT EVAL MOD COMPLEX 30 MIN: CPT

## 2025-03-13 NOTE — PROGRESS NOTES
left)  8-22 min = 1 unit; 23-37 min = 2 units; 38-52 min = 3 units; 53-67 min = 4 units; 68-82 min = 5 units   Total Total     [x]  Patient Education billed concurrently with other procedures   [x] Review HEP    [] Progressed/Changed HEP, detail:    [] Other detail:       Objective Information/Functional Measures/Assessment    See POC    Patient will continue to benefit from skilled PT / OT services to modify and progress therapeutic interventions, analyze and address functional mobility deficits, analyze and address ROM deficits, analyze and address strength deficits, analyze and address soft tissue restrictions, analyze and cue for proper movement patterns, and analyze and modify for postural abnormalities to address functional deficits and attain remaining goals.    Progress toward goals / Updated goals:  [x]  See POC    See POC    PLAN  yes Continue plan of care  [x]  Upgrade activities as tolerated  []  Discharge due to :  []  Other:    Kerwin Warner PT    3/13/2025    7:37 AM  If an interpreting service was utilized for treatment of this patient, the contents of this document represent the material reviewed with the patient via the .     Future Appointments   Date Time Provider Department Center   3/13/2025  9:00 AM Kerwin Warner, PT [facility]
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
## 2025-03-20 ENCOUNTER — HOSPITAL ENCOUNTER (OUTPATIENT)
Facility: HOSPITAL | Age: 55
Setting detail: RECURRING SERIES
Discharge: HOME | End: 2025-03-23
Payer: COMMERCIAL

## 2025-03-20 PROCEDURE — 97535 SELF CARE MNGMENT TRAINING: CPT

## 2025-03-20 PROCEDURE — 97140 MANUAL THERAPY 1/> REGIONS: CPT

## 2025-03-20 NOTE — PROGRESS NOTES
PHYSICAL / OCCUPATIONAL THERAPY - DAILY TREATMENT NOTE    Patient Name: Honey Tirado    Date: 3/20/2025    : 1970  Insurance: Payor: KATHRYN LIZ / Plan: [employer]CH LI[place] / Product Type: *No Product type* /      Patient  verified Yes     Visit #   Current / Total 2 8-12   Time   In / Out 940 1020   Pain   In / Out 2 2   Subjective Functional Status/Changes: Got a referral to ortho and having MRI on shoulder tomorrow     TREATMENT AREA =  Right shoulder pain    OBJECTIVE    Modalities Rationale:     decrease inflammation, decrease pain, and increase tissue extensibility to improve patient's ability to progress to PLOF and address remaining functional goals.     min [] Estim Unattended, type/location:                                      []  w/ice    []  w/heat    min [] Estim Attended, type/location:                                     []  w/US     []  w/ice    []  w/heat    []  TENS insruct      min []  Mechanical Traction: type/lbs                   []  pro   []  sup   []  int   []  cont    []  before manual    []  after manual    min []  Ultrasound, settings/location:     10 min  unbill [x]  Ice     []  Heat    location/position: (R) shoulder    min []  Paraffin,  details:     min []  Vasopneumatic Device, press/temp:     min []  Whirlpool / Fluido:    If using vaso (only need to measure limb vaso being performed on)      pre-treatment girth :       post-treatment girth :       measured at (landmark location) :      min []  Other:    Skin assessment post-treatment:   Intact      Therapeutic Procedures:    Tx Min Billable or 1:1 Min (if diff from Tx Min) Procedure, Rationale, Specifics   15  [id number] Manual Therapy (timed):  decrease pain, increase ROM, and increase tissue extensibility to improve patient's ability to progress to PLOF and address remaining functional goals.  The manual therapy interventions were performed at a separate and distinct time from the therapeutic activities interventions .

## 2025-03-27 ENCOUNTER — TELEPHONE (OUTPATIENT)
Facility: HOSPITAL | Age: 55
End: 2025-03-27

## 2025-03-27 NOTE — TELEPHONE ENCOUNTER
pt called us to cxl last schd appt, she is currently wait ing on results back from her Dr for an MRI, pt wcb after getting results

## 2025-04-01 ENCOUNTER — APPOINTMENT (OUTPATIENT)
Facility: HOSPITAL | Age: 55
End: 2025-04-01
Payer: COMMERCIAL

## 2025-04-09 ENCOUNTER — HOSPITAL ENCOUNTER (OUTPATIENT)
Facility: HOSPITAL | Age: 55
Setting detail: RECURRING SERIES
Discharge: HOME | End: 2025-04-12
Payer: COMMERCIAL

## 2025-04-09 PROCEDURE — 97032 APPL MODALITY 1+ESTIM EA 15: CPT

## 2025-04-09 PROCEDURE — 97140 MANUAL THERAPY 1/> REGIONS: CPT

## 2025-04-09 PROCEDURE — 97535 SELF CARE MNGMENT TRAINING: CPT

## 2025-04-09 NOTE — PROGRESS NOTES
activities as tolerated  - Discharge due to :      Williams Wilder PTA    4/9/2025    6:52 AM  If an interpreting service was utilized for treatment of this patient, the contents of this document represent the material reviewed with the patient via the .     Future Appointments   Date Time Provider Department [place]   4/9/2025  1:00 PM Williams Wilder PTA [facility]   4/14/2025  8:20 AM Williams Wilder PTA [facility]   4/23/2025 10:20 AM Williams Wilder PTA [facility]   4/25/2025 10:20 AM Williams Wilder PTA [facility]

## 2025-04-11 NOTE — THERAPY RECERTIFICATION
[facility] - [facility]  [address], [address], [place] [address] Ph: [phone] Fx: [phone]  PHYSICAL THERAPY PROGRESS NOTE  Patient Name: Honey Tirado : 1970   Medical/Treatment Diagnosis: Right shoulder pain   Referral Source: Viki Tuttle MD     Date of Initial Visit: 3/13/25 Attended Visits: 3 Missed Visits: 0     SUMMARY OF TREATMENT    Pt seen in clinic for to address Right shoulder pain [M25.511]. Pt has been assessed, completed therapeutic exercises, neuromuscular re-ed, therapeutic functional activity, received manual therapy intervention, self-care strategies, HEP techniques and pt ed on condition consistency and follow-through.       CURRENT STATUS    Patient has only received 2 full treatment session since initial eval on 3/13/24.  Patient was placed on hold until she received her MRI and was seen by MD for results.  Patient returned on 25 with results of MRI and stated that she had the fatty deposit on her (R) shoulder removed.  No change toward below goals has been made at this time, but patient would benefit from further treatment to address structural issues per MRI results    Short Term Goals: To be accomplished in 2-3 weeks  1) Patient to be independent and compliant with initial HEP to prevent further disability. 4/10/25 not at this time.  2) Patient will report decreased c/o pain to < or = 2/10 at worst to facilitate increased use w/ ADL's and overhead activities with manageable sx in the right shoulder.  3) Patient to report >/= to +2 on GROC indicating improved overhead mobility and independence w/ functional tasks  Long Term Goals: To be accomplished in 4-6 weeks  1) Patient to be independent & compliant with progressive HEP in preparation for D/C.  2) Patient will demonstrate full pain free AROM of the right shoulder to allow for return to all activities.  3) Patient will demonstrate >/= to 5-/5 right scapular and

## 2025-04-16 ENCOUNTER — HOSPITAL ENCOUNTER (OUTPATIENT)
Facility: HOSPITAL | Age: 55
Setting detail: RECURRING SERIES
Discharge: HOME | End: 2025-04-19
Payer: COMMERCIAL

## 2025-04-16 PROCEDURE — 97110 THERAPEUTIC EXERCISES: CPT

## 2025-04-16 PROCEDURE — 97032 APPL MODALITY 1+ESTIM EA 15: CPT

## 2025-04-16 PROCEDURE — 97140 MANUAL THERAPY 1/> REGIONS: CPT

## 2025-04-16 NOTE — PROGRESS NOTES
PHYSICAL / OCCUPATIONAL THERAPY - DAILY TREATMENT NOTE    Patient Name: Honey Tirado    Date: 2025    : 1970  Insurance: Payor: KATHRYN LIZ / Plan: [employer]CH LIZ [place] / Product Type: *No Product type* /      Patient  verified Yes     Visit #   Current / Total 5 8-12   Time   In / Out 1140 1240   Pain   In / Out 3 3   Subjective Functional Status/Changes: I did the exercises and they felt good to do.     TREATMENT AREA =  Right shoulder pain    OBJECTIVE    Modalities Rationale:     decrease inflammation, decrease pain, and increase tissue extensibility to improve patient's ability to progress to PLOF and address remaining functional goals.     min [] Estim Unattended, type/location:                                      []  w/ice    []  w/heat   8 min [x] Estim Attended, type/location:  (R) UT pulsed at 20%, 1.2 MHz                                   [x]  w/US     []  w/ice    []  w/heat    []  TENS insruct      min []  Mechanical Traction: type/lbs                   []  pro   []  sup   []  int   []  cont    []  before manual    []  after manual    min []  Ultrasound, settings/location:     10 min  unbill [x]  Ice     []  Heat    location/position: (R) shoulder    min []  Paraffin,  details:     min []  Vasopneumatic Device, press/temp:     min []  Whirlpool / Fluido:    If using vaso (only need to measure limb vaso being performed on)      pre-treatment girth :       post-treatment girth :       measured at (landmark location) :      min []  Other:    Skin assessment post-treatment:   Intact      Therapeutic Procedures:    Tx Min Billable or 1:1 Min (if diff from Tx Min) Procedure, Rationale, Specifics   25  37151 Manual Therapy (timed):  decrease pain, increase ROM, and increase tissue extensibility to improve patient's ability to progress to PLOF and address remaining functional goals.  The manual therapy interventions were performed at a separate and distinct time from the therapeutic activities

## 2025-04-23 ENCOUNTER — HOSPITAL ENCOUNTER (OUTPATIENT)
Facility: HOSPITAL | Age: 55
Setting detail: RECURRING SERIES
End: 2025-04-23
Payer: COMMERCIAL

## 2025-04-25 ENCOUNTER — APPOINTMENT (OUTPATIENT)
Facility: HOSPITAL | Age: 55
End: 2025-04-25
Payer: COMMERCIAL

## 2025-04-29 ENCOUNTER — HOSPITAL ENCOUNTER (OUTPATIENT)
Facility: HOSPITAL | Age: 55
Setting detail: RECURRING SERIES
Discharge: HOME | End: 2025-05-02
Payer: COMMERCIAL

## 2025-04-29 PROCEDURE — 97140 MANUAL THERAPY 1/> REGIONS: CPT

## 2025-04-29 PROCEDURE — 97032 APPL MODALITY 1+ESTIM EA 15: CPT

## 2025-04-29 PROCEDURE — 97110 THERAPEUTIC EXERCISES: CPT
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
## 2025-05-02 ENCOUNTER — HOSPITAL ENCOUNTER (OUTPATIENT)
Facility: HOSPITAL | Age: 55
Setting detail: RECURRING SERIES
Discharge: HOME | End: 2025-05-05
Payer: COMMERCIAL

## 2025-05-02 PROCEDURE — 97140 MANUAL THERAPY 1/> REGIONS: CPT

## 2025-05-02 PROCEDURE — 97032 APPL MODALITY 1+ESTIM EA 15: CPT

## 2025-05-02 PROCEDURE — 97110 THERAPEUTIC EXERCISES: CPT

## 2025-05-02 NOTE — PROGRESS NOTES
PHYSICAL / OCCUPATIONAL THERAPY - DAILY TREATMENT NOTE    Patient Name: Honey Tirado    Date: 2025    : 1970  Insurance: Payor: KATHRYN LIZ / Plan: MITCH LIZ [place] / Product Type: *No Product type* /      Patient  verified Yes     Visit #   Current / Total 7 8-12   Time   In / Out 940 1030   Pain   In / Out 3 3   Subjective Functional Status/Changes: I have this pain today at the side of my shoulder (biceps tendon region)     TREATMENT AREA =  Right shoulder pain    OBJECTIVE    Modalities Rationale:     decrease inflammation, decrease pain, and increase tissue extensibility to improve patient's ability to progress to PLOF and address remaining functional goals.     min [] Estim Unattended, type/location:                                      []  w/ice    []  w/heat   8 min [x] Estim Attended, type/location:  (R) UT pulsed at 20%, 1.2 MHz                                   [x]  w/US     []  w/ice    []  w/heat    []  TENS insruct      min []  Mechanical Traction: type/lbs                   []  pro   []  sup   []  int   []  cont    []  before manual    []  after manual    min []  Ultrasound, settings/location:     10 min  unbill [x]  Ice     []  Heat    location/position: (R) shoulder    min []  Paraffin,  details:     min []  Vasopneumatic Device, press/temp:     min []  Whirlpool / Fluido:    If using vaso (only need to measure limb vaso being performed on)      pre-treatment girth :       post-treatment girth :       measured at (landmark location) :      min []  Other:    Skin assessment post-treatment:   Intact      Therapeutic Procedures:    Tx Min Billable or 1:1 Min (if diff from Tx Min) Procedure, Rationale, Specifics   20 [id number] Manual Therapy (timed):  decrease pain, increase ROM, and increase tissue extensibility to improve patient's ability to progress to PLOF and address remaining functional goals.  The manual therapy interventions were performed at a separate and distinct time from

## 2025-05-05 ENCOUNTER — HOSPITAL ENCOUNTER (OUTPATIENT)
Facility: HOSPITAL | Age: 55
Setting detail: RECURRING SERIES
Discharge: HOME | End: 2025-05-08
Payer: COMMERCIAL

## 2025-05-05 PROCEDURE — 97140 MANUAL THERAPY 1/> REGIONS: CPT

## 2025-05-05 PROCEDURE — 97110 THERAPEUTIC EXERCISES: CPT

## 2025-05-05 PROCEDURE — 97032 APPL MODALITY 1+ESTIM EA 15: CPT

## 2025-05-05 NOTE — PROGRESS NOTES
activities interventions . (see flow sheet as applicable)     Details if applicable:  (R) infraspinatus release with ER, gentle oscillation with distraction. DTM (R) UT region and into supraspinatus. AC and SC joint mobs.     17  68786 Therapeutic Exercise (timed):  increase ROM, strength, coordination, balance, and proprioception to improve patient's ability to progress to PLOF and address remaining functional goals. (see flow sheet as applicable)     Details if applicable:       20651 Neuromuscular Re-Education (timed):  improve balance, coordination, kinesthetic sense, posture, core stability and proprioception to improve patient's ability to develop conscious control of individual muscles and awareness of position of extremities in order to progress to PLOF and address remaining functional goals. (see flow sheet as applicable)     Details if applicable:       20671 Therapeutic Activity (timed):  use of dynamic activities replicating functional movements to increase ROM, strength, coordination, balance, and proprioception in order to improve patient's ability to progress to PLOF and address remaining functional goals.  (see flow sheet as applicable)     Details if applicable:       44471 Self Care/Home Management (timed):  improve patient knowledge and understanding of home injury/symptom/pain management, positioning, activity modification, joint protection strategies, and    to improve patient's ability to progress to PLOF and address remaining functional goals.  (see flow sheet as applicable)     Details if applicable:  education for RTC musculature.   42  [facility] Totals Reminder: bill using total billable min of TIMED therapeutic procedures (example: do not include dry needle or estim unattended, both untimed codes, in totals to left)  8-22 min = 1 unit; 23-37 min = 2 units; 38-52 min = 3 units; 53-67 min = 4 units; 68-82 min = 5 units   Total Total     Charge Capture    [x]  Patient Education billed

## 2025-05-07 ENCOUNTER — HOSPITAL ENCOUNTER (OUTPATIENT)
Facility: HOSPITAL | Age: 55
Setting detail: RECURRING SERIES
Discharge: HOME | End: 2025-05-10
Payer: COMMERCIAL

## 2025-05-07 PROCEDURE — 97535 SELF CARE MNGMENT TRAINING: CPT

## 2025-05-07 PROCEDURE — 97140 MANUAL THERAPY 1/> REGIONS: CPT

## 2025-05-07 PROCEDURE — 97530 THERAPEUTIC ACTIVITIES: CPT

## 2025-05-07 NOTE — PROGRESS NOTES
feeling better.  2) Patient will report decreased c/o pain to < or = 2/10 at worst to facilitate increased use w/ ADL's and overhead activities with manageable sx in the right shoulder. Progressing with reduced symptoms/pain 5/2/25  3) Patient to report >/= to +2 on GROC indicating improved overhead mobility and independence w/ functional tasks    Long Term Goals: To be accomplished in 4-6 weeks  1) Patient to be independent & compliant with progressive HEP in preparation for D/C.  2) Patient will demonstrate full pain free AROM of the right shoulder to allow for return to all activities. Progressing 5/5/25  3) Patient will demonstrate >/= to 5-/5 right scapular and shoulder strength to allow for light lifting without pain and return to PLF  3.) Patient will decrease Quick DASH to </= to 25% indicating improved functional mobility and [place] with ADL's.    Next PN/ RC due 5/13/25  Auth due (visit number/ date) TBD    PLAN  - Continue Plan of Care  - Upgrade activities as tolerated  - Discharge due to :      Williams Wilder PTA    5/7/2025    7:03 AM  If an interpreting service was utilized for treatment of this patient, the contents of this document represent the material reviewed with the patient via the .     Future Appointments   Date Time Provider Department Center   5/7/2025  9:40 AM Williams Wilder PTA [facility]

## 2025-05-08 NOTE — THERAPY RECERTIFICATION
[facility] - [facility]  [address], [address], [place] [address] Ph: [phone] Fx: [phone]  PHYSICAL THERAPY PROGRESS NOTE  Patient Name: Honey Tirado : 1970   Medical/Treatment Diagnosis: Right shoulder pain   Referral Source: Viki Tuttle MD     Date of Initial Visit: 3/13/25     Attended Visits: 9 Missed Visits: 1     SUMMARY OF TREATMENT    Pt seen in clinic for to address Right shoulder pain [M25.511]. Pt has been assessed, completed therapeutic exercises, neuromuscular re-ed, therapeutic functional activity, received manual therapy intervention, self-care strategies, HEP techniques and pt ed on condition consistency and follow-through.       CURRENT STATUS    Patient has had good tolerance to all therapeutic interventions and reports an overall 50% improvement with symptom/pain reduction, AROM and overall strength.  Patient continues to have increase shoulder pain with reaching overhead and across her body for dressing activities.  Incision site at the anterior portion of her (R) shoulder has healed well and no reports of restrictions at this site.     Patient reports ~ 50% reduction of symptoms in (R) shoulder     Patient reports ~ 50% improvement with use of (R) UE to perform general ADLs.                                     AROM                                                              PROM    Right   Right   Flexion ~ 160 Flexion ~ 155   Extension ~ 60 Extension ~ 70   Scaption/ABD ~ 150 Scaptin/ABD ~ 165   KEITH T1 @ 90  degrees P! ER @ 90 Degrees ~ 95   FIR T5 P! IR @ 90 Degrees ~ 90   Cross body reach Anterior (L) shoulder         Patient is presenting with decrease intensity of symptoms in shoulder, but continues to have pain at end range of motion.  Patient presents along biceps tendon region primarily.     Reporting increase use of (R) UE to perform general ADLs with less difficulty     Patient is able to lift 3# over head

## 2025-05-14 ENCOUNTER — HOSPITAL ENCOUNTER (OUTPATIENT)
Facility: HOSPITAL | Age: 55
Setting detail: RECURRING SERIES
Discharge: HOME | End: 2025-05-17
Payer: COMMERCIAL

## 2025-05-14 PROCEDURE — 97140 MANUAL THERAPY 1/> REGIONS: CPT

## 2025-05-14 PROCEDURE — 97032 APPL MODALITY 1+ESTIM EA 15: CPT

## 2025-05-14 PROCEDURE — 97110 THERAPEUTIC EXERCISES: CPT

## 2025-05-14 PROCEDURE — 97535 SELF CARE MNGMENT TRAINING: CPT

## 2025-05-14 NOTE — PROGRESS NOTES
PHYSICAL / OCCUPATIONAL THERAPY - DAILY TREATMENT NOTE    Patient Name: Honey Tirado    Date: 2025    : 1970  Insurance: Payor: KATHRYN LIZ / Plan: MITCH LIZ [place] / Product Type: *No Product type* /      Patient  verified Yes     Visit #   Current / Total 10 20   Time   In / Out 1100 1200   Pain   In / Out 4 2   Subjective Functional Status/Changes: I did some drawing and took breaks, but that night I couldn't sleep and my arm was really painful     TREATMENT AREA =  Right shoulder pain    OBJECTIVE    Modalities Rationale:     decrease inflammation, decrease pain, and increase tissue extensibility to improve patient's ability to progress to PLOF and address remaining functional goals.     min [] Estim Unattended, type/location:                                      []  w/ice    []  w/heat   8 min [x] Estim Attended, type/location:  (R) UT pulsed at 20%, 1.2 MHz                                   [x]  w/US     []  w/ice    []  w/heat    []  TENS insruct      min []  Mechanical Traction: type/lbs                   []  pro   []  sup   []  int   []  cont    []  before manual    []  after manual    min []  Ultrasound, settings/location:     10 min  unbill [x]  Ice     []  Heat    location/position: (R) shoulder    min []  Paraffin,  details:     min []  Vasopneumatic Device, press/temp:     min []  Whirlpool / Fluido:    If using vaso (only need to measure limb vaso being performed on)      pre-treatment girth :       post-treatment girth :       measured at (landmark location) :      min []  Other:    Skin assessment post-treatment:   Intact      Therapeutic Procedures:    Tx Min Billable or 1:1 Min (if diff from Tx Min) Procedure, Rationale, Specifics   15  [id number] Manual Therapy (timed):  decrease pain, increase ROM, and increase tissue extensibility to improve patient's ability to progress to PLOF and address remaining functional goals.  The manual therapy interventions were performed at a

## 2025-05-16 ENCOUNTER — HOSPITAL ENCOUNTER (OUTPATIENT)
Facility: HOSPITAL | Age: 55
Setting detail: RECURRING SERIES
Discharge: HOME | End: 2025-05-19
Payer: COMMERCIAL

## 2025-05-16 PROCEDURE — 97140 MANUAL THERAPY 1/> REGIONS: CPT

## 2025-05-16 PROCEDURE — 97110 THERAPEUTIC EXERCISES: CPT

## 2025-05-16 PROCEDURE — 97032 APPL MODALITY 1+ESTIM EA 15: CPT

## 2025-05-16 PROCEDURE — 97112 NEUROMUSCULAR REEDUCATION: CPT

## 2025-05-16 NOTE — PROGRESS NOTES
PHYSICAL / OCCUPATIONAL THERAPY - DAILY TREATMENT NOTE    Patient Name: Honey Tirado    Date: 2025    : 1970  Insurance: Payor: KATHRYN LIZ / Plan: MITCH LIZ [place] / Product Type: *No Product type* /      Patient  verified Yes     Visit #   Current / Total 11 20   Time   In / Out 945 1035   Pain   In / Out 3 2   Subjective Functional Status/Changes: It feels much better since last session.  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     TREATMENT AREA =  Right shoulder pain    OBJECTIVE    Modalities Rationale:     decrease inflammation, decrease pain, and increase tissue extensibility to improve patient's ability to progress to PLOF and address remaining functional goals.     min [] Estim Unattended, type/location:                                      []  w/ice    []  w/heat   8 min [x] Estim Attended, type/location:  (R) UT pulsed at 20%, 1.2 MHz                                   [x]  w/US     []  w/ice    []  w/heat    []  TENS insruct      min []  Mechanical Traction: type/lbs                   []  pro   []  sup   []  int   []  cont    []  before manual    []  after manual    min []  Ultrasound, settings/location:      min  unbill [x]  Ice     []  Heat    location/position: (R) shoulder    min []  Paraffin,  details:     min []  Vasopneumatic Device, press/temp:     min []  Whirlpool / Fluido:    If using vaso (only need to measure limb vaso being performed on)      pre-treatment girth :       post-treatment girth :       measured at (landmark location) :      min []  Other:    Skin assessment post-treatment:   Intact      Therapeutic Procedures:    Tx Min Billable or 1:1 Min (if diff from Tx Min) Procedure, Rationale, Specifics   20  76186 Manual Therapy (timed):  decrease pain, increase ROM, and increase tissue extensibility to improve patient's ability to progress to PLOF and address remaining functional goals.  The manual therapy

## 2025-05-20 ENCOUNTER — HOSPITAL ENCOUNTER (OUTPATIENT)
Facility: HOSPITAL | Age: 55
Setting detail: RECURRING SERIES
Discharge: HOME | End: 2025-05-23
Payer: COMMERCIAL

## 2025-05-20 PROCEDURE — 97112 NEUROMUSCULAR REEDUCATION: CPT

## 2025-05-20 PROCEDURE — G0283 ELEC STIM OTHER THAN WOUND: HCPCS

## 2025-05-20 PROCEDURE — 97140 MANUAL THERAPY 1/> REGIONS: CPT

## 2025-05-20 PROCEDURE — 97110 THERAPEUTIC EXERCISES: CPT

## 2025-05-20 NOTE — PROGRESS NOTES
left)  8-22 min = 1 unit; 23-37 min = 2 units; 38-52 min = 3 units; 53-67 min = 4 units; 68-82 min = 5 units   Total Total     Charge Capture    [x]  Patient Education billed concurrently with other procedures   [x] Review HEP    [] Progressed/Changed HEP, detail:    [] Other detail:       Objective Information/Functional Measures/Assessment                                     AROM                                                              PROM   Right  Right   Flexion ~ 160 Flexion ~ 155   Extension ~ 60 Extension ~ 70   Scaption/ABD ~ 150 Scaptin/ABD ~ 165   KEITH T1 @ 90  degrees P! ER @ 90 Degrees ~ 95   FIR T5 P! 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    Patient is demonstrating increase AROM in flexion and ER but with pain at end ranges and through the end of arc of motion.    Patient will continue to benefit from skilled PT / OT services to modify and progress therapeutic interventions, analyze and address functional mobility deficits, analyze and address ROM deficits, analyze and address strength deficits, analyze and address soft tissue restrictions, and analyze and cue for proper movement patterns to address functional deficits and attain remaining goals.    Progress toward goals / Updated goals:  []  See Progress Note/Recertification    Short Term Goals:     1) Patient will report decreased c/o pain to < or = 2/10 at worst to facilitate increased use w/ ADL's and overhead activities with manageable sx in the right shoulder.   Current Status: Patient reports average pain in (R) shoulder: 3/10  Goal Met?  progressing     Long Term Goals:     1) Patient to be independent & compliant with progressive HEP in preparation for D/C.  Current Status: not at this time  Goal Met? Progressing 5/20/25     2) Patient will

## 2025-05-22 ENCOUNTER — HOSPITAL ENCOUNTER (OUTPATIENT)
Facility: HOSPITAL | Age: 55
Setting detail: RECURRING SERIES
Discharge: HOME | End: 2025-05-25
Payer: COMMERCIAL

## 2025-05-22 PROCEDURE — 97032 APPL MODALITY 1+ESTIM EA 15: CPT

## 2025-05-22 PROCEDURE — 97110 THERAPEUTIC EXERCISES: CPT

## 2025-05-22 PROCEDURE — 97112 NEUROMUSCULAR REEDUCATION: CPT

## 2025-05-22 PROCEDURE — 97140 MANUAL THERAPY 1/> REGIONS: CPT

## 2025-05-22 NOTE — PROGRESS NOTES
940PHYSICAL / OCCUPATIONAL THERAPY - DAILY TREATMENT NOTE    Patient Name: Honey Tirado    Date: 2025    : 1970  Insurance: Payor: KATHRYN LIZ / Plan: [employer] [place] [employer] / Product Type: *No Product type* /      Patient  verified Yes     Visit #   Current / Total 13 20   Time   In / Out 940 1040   Pain   In / Out 3 0   Subjective Functional Status/Changes: I woke up and my shoulder was bothering me.  So I know it was my sleeping.     TREATMENT AREA =  Bursitis of right shoulder  Bicipital tendinitis, right shoulder  Right shoulder pain    OBJECTIVE    Modalities Rationale:     decrease inflammation, decrease pain, and increase tissue extensibility to improve patient's ability to progress to PLOF and address remaining functional goals.     min [] Estim Unattended, type/location:                                      []  w/ice    []  w/heat   8 min [x] Estim Attended, type/location:  (R) UT pulsed at 20%, 1.2 MHz                                   [x]  w/US     []  w/ice    []  w/heat    []  TENS insruct      min []  Mechanical Traction: type/lbs                   []  pro   []  sup   []  int   []  cont    []  before manual    []  after manual    min []  Ultrasound, settings/location:      min  unbill [x]  Ice     []  Heat    location/position: (R) shoulder    min []  Paraffin,  details:     min []  Vasopneumatic Device, press/temp:     min []  Whirlpool / Fluido:    If using vaso (only need to measure limb vaso being performed on)      pre-treatment girth :       post-treatment girth :       measured at (landmark location) :      min []  Other:    Skin assessment post-treatment:   Intact      Therapeutic Procedures:    Tx Min Billable or 1:1 Min (if diff from Tx Min) Procedure, Rationale, Specifics   15  43460 Manual Therapy (timed):  decrease pain, increase ROM, and increase tissue extensibility to improve patient's ability to progress to PLOF and address remaining functional goals.  The manual

## 2025-05-28 ENCOUNTER — HOSPITAL ENCOUNTER (OUTPATIENT)
Facility: HOSPITAL | Age: 55
Setting detail: RECURRING SERIES
Discharge: HOME | End: 2025-05-31
Payer: COMMERCIAL

## 2025-05-28 PROCEDURE — 97110 THERAPEUTIC EXERCISES: CPT

## 2025-05-28 PROCEDURE — 97032 APPL MODALITY 1+ESTIM EA 15: CPT

## 2025-05-28 PROCEDURE — 97112 NEUROMUSCULAR REEDUCATION: CPT

## 2025-05-28 PROCEDURE — 97140 MANUAL THERAPY 1/> REGIONS: CPT

## 2025-05-28 NOTE — PROGRESS NOTES
940PHYSICAL / OCCUPATIONAL THERAPY - DAILY TREATMENT NOTE    Patient Name: Honey Tirado    Date: 2025    : 1970  Insurance: Payor: KATHRYN LIZ / Plan: [employer] [place] [employer] / Product Type: *No Product type* /      Patient  verified Yes     Visit #   Current / Total 14 20   Time   In / Out 940 1040   Pain   In / Out 0 1   Subjective Functional Status/Changes: I found when I got into my scalenes massaging them I could move my arm more freely.     TREATMENT AREA =  Bursitis of right shoulder  Bicipital tendinitis, right shoulder  Right shoulder pain    OBJECTIVE    Modalities Rationale:     decrease inflammation, decrease pain, and increase tissue extensibility to improve patient's ability to progress to PLOF and address remaining functional goals.     min [] Estim Unattended, type/location:                                      []  w/ice    []  w/heat   8 min [x] Estim Attended, type/location:  (R) UT pulsed at 20%, 1.2 MHz                                   [x]  w/US     []  w/ice    []  w/heat    []  TENS insruct      min []  Mechanical Traction: type/lbs                   []  pro   []  sup   []  int   []  cont    []  before manual    []  after manual    min []  Ultrasound, settings/location:      min  unbill [x]  Ice     []  Heat    location/position: (R) shoulder    min []  Paraffin,  details:     min []  Vasopneumatic Device, press/temp:     min []  Whirlpool / Fluido:    If using vaso (only need to measure limb vaso being performed on)      pre-treatment girth :       post-treatment girth :       measured at (landmark location) :      min []  Other:    Skin assessment post-treatment:   Intact      Therapeutic Procedures:    Tx Min Billable or 1:1 Min (if diff from Tx Min) Procedure, Rationale, Specifics   22  43254 Manual Therapy (timed):  decrease pain, increase ROM, and increase tissue extensibility to improve patient's ability to progress to PLOF and address remaining functional goals.  The

## 2025-05-30 ENCOUNTER — HOSPITAL ENCOUNTER (OUTPATIENT)
Facility: HOSPITAL | Age: 55
Setting detail: RECURRING SERIES
End: 2025-05-30
Payer: COMMERCIAL

## 2025-05-30 PROCEDURE — 97112 NEUROMUSCULAR REEDUCATION: CPT

## 2025-05-30 PROCEDURE — 97110 THERAPEUTIC EXERCISES: CPT

## 2025-05-30 PROCEDURE — 97032 APPL MODALITY 1+ESTIM EA 15: CPT

## 2025-05-30 PROCEDURE — 97140 MANUAL THERAPY 1/> REGIONS: CPT

## 2025-05-30 NOTE — PROGRESS NOTES
940PHYSICAL / OCCUPATIONAL THERAPY - DAILY TREATMENT NOTE    Patient Name: Honey Tirado    Date: 2025    : 1970  Insurance: Payor: KATHRYN LIZ / Plan: [employer] [place] [employer] / Product Type: *No Product type* /      Patient  verified Yes     Visit #   Current / Total 15 20   Time   In / Out 900 1000   Pain   In / Out 2 1   Subjective Functional Status/Changes: It's more of muscle soreness from cooing a lot yesterday and then on the computer.  But I did support my arms like we talked about.     TREATMENT AREA =  Bursitis of right shoulder  Bicipital tendinitis, right shoulder  Right shoulder pain    OBJECTIVE    Modalities Rationale:     decrease inflammation, decrease pain, and increase tissue extensibility to improve patient's ability to progress to PLOF and address remaining functional goals.     min [] Estim Unattended, type/location:                                      []  w/ice    []  w/heat   8 min [x] Estim Attended, type/location:  (R) UT pulsed at 20%, 1.2 MHz                                   [x]  w/US     []  w/ice    []  w/heat    []  TENS insruct      min []  Mechanical Traction: type/lbs                   []  pro   []  sup   []  int   []  cont    []  before manual    []  after manual    min []  Ultrasound, settings/location:      min  unbill [x]  Ice     []  Heat    location/position: (R) shoulder    min []  Paraffin,  details:     min []  Vasopneumatic Device, press/temp:     min []  Whirlpool / Fluido:    If using vaso (only need to measure limb vaso being performed on)      pre-treatment girth :       post-treatment girth :       measured at (landmark location) :      min []  Other:    Skin assessment post-treatment:   Intact      Therapeutic Procedures:    Tx Min Billable or 1:1 Min (if diff from Tx Min) Procedure, Rationale, Specifics   22  63584 Manual Therapy (timed):  decrease pain, increase ROM, and increase tissue extensibility to improve patient's ability to progress to PLOF

## 2025-06-04 ENCOUNTER — HOSPITAL ENCOUNTER (OUTPATIENT)
Facility: HOSPITAL | Age: 55
Setting detail: RECURRING SERIES
Discharge: HOME | End: 2025-06-07
Payer: COMMERCIAL

## 2025-06-04 PROCEDURE — 97140 MANUAL THERAPY 1/> REGIONS: CPT

## 2025-06-04 PROCEDURE — 97032 APPL MODALITY 1+ESTIM EA 15: CPT

## 2025-06-04 PROCEDURE — 97535 SELF CARE MNGMENT TRAINING: CPT

## 2025-06-04 PROCEDURE — 97530 THERAPEUTIC ACTIVITIES: CPT

## 2025-06-04 NOTE — THERAPY RECERTIFICATION
[facility]  [address], [address], [place] [address] Ph: [phone] Fx: [phone]  PHYSICAL THERAPY PROGRESS NOTE  Patient Name: Honey Tirado : 1970   Medical/Treatment Diagnosis: Bursitis of right shoulder  Bicipital tendinitis, right shoulder  Right shoulder pain   Referral Source: Viki Tuttle MD     Date of Initial Visit: 3/13/25 Attended Visits: 16 Missed Visits: 1     SUMMARY OF TREATMENT    Pt seen in clinic for to address Right shoulder pain [M25.511]. Pt has been assessed, completed therapeutic exercises, neuromuscular re-ed, therapeutic functional activity, received manual therapy intervention, self-care strategies, HEP techniques and pt ed on condition consistency and follow-through.     CURRENT STATUS    Patient has made significant improvement with her ability to use her (R) UE and continue to have good tolerance to all therapeutic interventions.  Patient is reporting an increase use of her (R) UE with significantly less difficulty and decrease of overall symptoms.  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  Patient is presenting with increase mobility of her SC joint and AC joint which is allowing for increase (R) UE motion.  Also noted is a reduction of muscular tension throughout her shoulder complex and chest region along the clavicle.                                    AROM                                                                 Right   Flexion ~ 160    Extension ~ 60    Scaption/ABD ~ 140: pinching in shoulder   KEITH T1  2 @ 90  degrees: no pain   FIR T4 P!    Cross body reach Medial deltoid (L) shoulder: p! Along anterior shoulder.      GROC: +5 a good deal better.     Patient reports ~ 70% reduction of symptoms in (R) shoulder     Patient reports ~ 80% improvement with use of (R) UE to

## 2025-06-04 NOTE — PROGRESS NOTES
940PHYSICAL / OCCUPATIONAL THERAPY - DAILY TREATMENT NOTE    Patient Name: Honey Tirado    Date: 2025    : 1970  Insurance: Payor: KATHRYN LIZ / Plan: [employer] [place] [employer] / Product Type: *No Product type* /      Patient  verified Yes     Visit #   Current / Total 16 20   Time   In / Out 940 1025   Pain   In / Out 0 1   Subjective Functional Status/Changes: I have definitely noticed a difference in my arm over the past few weeks.     TREATMENT AREA =  Bursitis of right shoulder  Bicipital tendinitis, right shoulder  Right shoulder pain    OBJECTIVE    Modalities Rationale:     decrease inflammation, decrease pain, and increase tissue extensibility to improve patient's ability to progress to PLOF and address remaining functional goals.     min [] Estim Unattended, type/location:                                      []  w/ice    []  w/heat   8 min [x] Estim Attended, type/location:  (R) UT pulsed at 20%, 1.2 MHz                                   [x]  w/US     []  w/ice    []  w/heat    []  TENS insruct      min []  Mechanical Traction: type/lbs                   []  pro   []  sup   []  int   []  cont    []  before manual    []  after manual    min []  Ultrasound, settings/location:      min  unbill [x]  Ice     []  Heat    location/position: (R) shoulder    min []  Paraffin,  details:     min []  Vasopneumatic Device, press/temp:     min []  Whirlpool / Fluido:    If using vaso (only need to measure limb vaso being performed on)      pre-treatment girth :       post-treatment girth :       measured at (landmark location) :      min []  Other:    Skin assessment post-treatment:   Intact      Therapeutic Procedures:    Tx Min Billable or 1:1 Min (if diff from Tx Min) Procedure, Rationale, Specifics   10  56300 Manual Therapy (timed):  decrease pain, increase ROM, and increase tissue extensibility to improve patient's ability to progress to PLOF and address remaining functional goals.  The manual therapy

## 2025-06-06 ENCOUNTER — APPOINTMENT (OUTPATIENT)
Facility: HOSPITAL | Age: 55
End: 2025-06-06
Payer: COMMERCIAL

## 2025-06-11 ENCOUNTER — HOSPITAL ENCOUNTER (OUTPATIENT)
Facility: HOSPITAL | Age: 55
Setting detail: RECURRING SERIES
Discharge: HOME | End: 2025-06-14
Payer: COMMERCIAL

## 2025-06-11 PROCEDURE — 97110 THERAPEUTIC EXERCISES: CPT

## 2025-06-11 PROCEDURE — 97530 THERAPEUTIC ACTIVITIES: CPT

## 2025-06-11 PROCEDURE — 97032 APPL MODALITY 1+ESTIM EA 15: CPT

## 2025-06-11 PROCEDURE — 97112 NEUROMUSCULAR REEDUCATION: CPT

## 2025-06-11 NOTE — PROGRESS NOTES
940PHYSICAL / OCCUPATIONAL THERAPY - DAILY TREATMENT NOTE    Patient Name: Honey Tirado    Date: 2025    : 1970  Insurance: Payor: KATHRYN LIZ / Plan: [employer] [place] [employer] / Product Type: *No Product type* /      Patient  verified Yes     Visit #   Current / Total 17 24   Time   In / Out 940 1040   Pain   In / Out 0 0   Subjective Functional Status/Changes: Haven't really had a problem, pain has been doing well     TREATMENT AREA =  Bursitis of right shoulder  Bicipital tendinitis, right shoulder  Right shoulder pain    OBJECTIVE    Modalities Rationale:     decrease inflammation, decrease pain, and increase tissue extensibility to improve patient's ability to progress to PLOF and address remaining functional goals.     min [] Estim Unattended, type/location:                                      []  w/ice    []  w/heat   8 min [x] Estim Attended, type/location:  (R) infraspinatus. pulsed at 20%, 1.2 MHz                                   [x]  w/US     []  w/ice    []  w/heat    []  TENS insruct      min []  Mechanical Traction: type/lbs                   []  pro   []  sup   []  int   []  cont    []  before manual    []  after manual    min []  Ultrasound, settings/location:      min  unbill [x]  Ice     []  Heat    location/position: (R) shoulder    min []  Paraffin,  details:     min []  Vasopneumatic Device, press/temp:     min []  Whirlpool / Fluido:    If using vaso (only need to measure limb vaso being performed on)      pre-treatment girth :       post-treatment girth :       measured at (landmark location) :      min []  Other:    Skin assessment post-treatment:   Intact      Therapeutic Procedures:    Tx Min Billable or 1:1 Min (if diff from Tx Min) Procedure, Rationale, Specifics   ND  78762 Manual Therapy (timed):  decrease pain, increase ROM, and increase tissue extensibility to improve patient's ability to progress to PLOF and address remaining functional goals.  The manual therapy

## 2025-06-13 ENCOUNTER — HOSPITAL ENCOUNTER (OUTPATIENT)
Facility: HOSPITAL | Age: 55
Setting detail: RECURRING SERIES
Discharge: HOME | End: 2025-06-16
Payer: COMMERCIAL

## 2025-06-13 PROCEDURE — 97530 THERAPEUTIC ACTIVITIES: CPT

## 2025-06-13 PROCEDURE — 97032 APPL MODALITY 1+ESTIM EA 15: CPT

## 2025-06-13 PROCEDURE — 97112 NEUROMUSCULAR REEDUCATION: CPT

## 2025-06-13 PROCEDURE — 97110 THERAPEUTIC EXERCISES: CPT

## 2025-06-13 NOTE — PROGRESS NOTES
940PHYSICAL / OCCUPATIONAL THERAPY - DAILY TREATMENT NOTE    Patient Name: Honey Tirado    Date: 2025    : 1970  Insurance: Payor: KATHRYN LIZ / Plan: [employer] [place] [employer] / Product Type: *No Product type* /      Patient  verified Yes     Visit #   Current / Total 18 24   Time   In / Out 940 1030   Pain   In / Out 0 0   Subjective Functional Status/Changes: No pain today and nothing down into my elbow.     TREATMENT AREA =  Bursitis of right shoulder  Bicipital tendinitis, right shoulder  Right shoulder pain    OBJECTIVE    Modalities Rationale:     decrease inflammation, decrease pain, and increase tissue extensibility to improve patient's ability to progress to PLOF and address remaining functional goals.     min [] Estim Unattended, type/location:                                      []  w/ice    []  w/heat   8 min [x] Estim Attended, type/location:  (R) infraspinatus. pulsed at 20%, 1.2 MHz                                   [x]  w/US     []  w/ice    []  w/heat    []  TENS insruct      min []  Mechanical Traction: type/lbs                   []  pro   []  sup   []  int   []  cont    []  before manual    []  after manual    min []  Ultrasound, settings/location:      min  unbill [x]  Ice     []  Heat    location/position: (R) shoulder    min []  Paraffin,  details:     min []  Vasopneumatic Device, press/temp:     min []  Whirlpool / Fluido:    If using vaso (only need to measure limb vaso being performed on)      pre-treatment girth :       post-treatment girth :       measured at (landmark location) :      min []  Other:    Skin assessment post-treatment:   Intact      Therapeutic Procedures:    Tx Min Billable or 1:1 Min (if diff from Tx Min) Procedure, Rationale, Specifics   ND  47709 Manual Therapy (timed):  decrease pain, increase ROM, and increase tissue extensibility to improve patient's ability to progress to PLOF and address remaining functional goals.  The manual therapy interventions

## 2025-06-16 ENCOUNTER — HOSPITAL ENCOUNTER (OUTPATIENT)
Facility: HOSPITAL | Age: 55
Setting detail: RECURRING SERIES
Discharge: HOME | End: 2025-06-19
Payer: COMMERCIAL

## 2025-06-16 PROCEDURE — 97140 MANUAL THERAPY 1/> REGIONS: CPT

## 2025-06-16 PROCEDURE — 97110 THERAPEUTIC EXERCISES: CPT

## 2025-06-16 PROCEDURE — 97530 THERAPEUTIC ACTIVITIES: CPT

## 2025-06-16 PROCEDURE — 97032 APPL MODALITY 1+ESTIM EA 15: CPT

## 2025-06-16 NOTE — PROGRESS NOTES
940PHYSICAL / OCCUPATIONAL THERAPY - DAILY TREATMENT NOTE    Patient Name: Honey Tirado    Date: 2025    : 1970  Insurance: Payor: KATHRYN LIZ / Plan: [employer] [place] [employer] / Product Type: *No Product type* /      Patient  verified Yes     Visit #   Current / Total 19 24   Time   In / Out 1020 1110   Pain   In / Out 0 0   Subjective Functional Status/Changes: No pain in the back of shoulder but more of a little impingement on the front and posterior sides of the shoulder.  No biceps tendon pain.     TREATMENT AREA =  Bursitis of right shoulder  Bicipital tendinitis, right shoulder  Right shoulder pain    OBJECTIVE    Modalities Rationale:     decrease inflammation, decrease pain, and increase tissue extensibility to improve patient's ability to progress to PLOF and address remaining functional goals.     min [] Estim Unattended, type/location:                                      []  w/ice    []  w/heat   8 min [x] Estim Attended, type/location:  (R) anterior to posterior of shoulder, continuous, 1.2 MHz                                   [x]  w/US     []  w/ice    []  w/heat    []  TENS insruct      min []  Mechanical Traction: type/lbs                   []  pro   []  sup   []  int   []  cont    []  before manual    []  after manual    min []  Ultrasound, settings/location:      min  unbill [x]  Ice     []  Heat    location/position: (R) shoulder    min []  Paraffin,  details:     min []  Vasopneumatic Device, press/temp:     min []  Whirlpool / Fluido:    If using vaso (only need to measure limb vaso being performed on)      pre-treatment girth :       post-treatment girth :       measured at (landmark location) :      min []  Other:    Skin assessment post-treatment:   Intact      Therapeutic Procedures:    Tx Min Billable or 1:1 Min (if diff from Tx Min) Procedure, Rationale, Specifics   15  74610 Manual Therapy (timed):  decrease pain, increase ROM, and increase tissue extensibility to improve

## 2025-06-18 ENCOUNTER — HOSPITAL ENCOUNTER (OUTPATIENT)
Facility: HOSPITAL | Age: 55
Setting detail: RECURRING SERIES
Discharge: HOME | End: 2025-06-21
Payer: COMMERCIAL

## 2025-06-18 PROCEDURE — 97530 THERAPEUTIC ACTIVITIES: CPT

## 2025-06-18 PROCEDURE — 97110 THERAPEUTIC EXERCISES: CPT

## 2025-06-18 PROCEDURE — 97112 NEUROMUSCULAR REEDUCATION: CPT

## 2025-06-18 NOTE — PROGRESS NOTES
940PHYSICAL / OCCUPATIONAL THERAPY - DAILY TREATMENT NOTE    Patient Name: Honey Tirado    Date: 2025    : 1970  Insurance: Payor: KATHRYN LIZ / Plan: [employer] [place] [employer] / Product Type: *No Product type* /      Patient  verified Yes     Visit #   Current / Total 20 24   Time   In / Out 940 1020   Pain   In / Out 0 0   Subjective Functional Status/Changes: Ihave been really well with my arm lately.     TREATMENT AREA =  Bursitis of right shoulder  Bicipital tendinitis, right shoulder  Right shoulder pain    OBJECTIVE    Modalities Rationale:     decrease inflammation, decrease pain, and increase tissue extensibility to improve patient's ability to progress to PLOF and address remaining functional goals.     min [] Estim Unattended, type/location:                                      []  w/ice    []  w/heat   ND min [x] Estim Attended, type/location:  (R) anterior to posterior of shoulder, continuous, 1.2 MHz                                   [x]  w/US     []  w/ice    []  w/heat    []  TENS insruct      min []  Mechanical Traction: type/lbs                   []  pro   []  sup   []  int   []  cont    []  before manual    []  after manual    min []  Ultrasound, settings/location:      min  unbill [x]  Ice     []  Heat    location/position: (R) shoulder    min []  Paraffin,  details:     min []  Vasopneumatic Device, press/temp:     min []  Whirlpool / Fluido:    If using vaso (only need to measure limb vaso being performed on)      pre-treatment girth :       post-treatment girth :       measured at (landmark location) :      min []  Other:    Skin assessment post-treatment:   Intact      Therapeutic Procedures:    Tx Min Billable or 1:1 Min (if diff from Tx Min) Procedure, Rationale, Specifics     25669 Manual Therapy (timed):  decrease pain, increase ROM, and increase tissue extensibility to improve patient's ability to progress to PLOF and address remaining functional goals.  The manual therapy

## 2025-06-25 ENCOUNTER — HOSPITAL ENCOUNTER (OUTPATIENT)
Facility: HOSPITAL | Age: 55
Setting detail: RECURRING SERIES
Discharge: HOME | End: 2025-06-28
Payer: COMMERCIAL

## 2025-06-25 PROCEDURE — 97140 MANUAL THERAPY 1/> REGIONS: CPT

## 2025-06-25 PROCEDURE — 97530 THERAPEUTIC ACTIVITIES: CPT

## 2025-06-25 PROCEDURE — 97535 SELF CARE MNGMENT TRAINING: CPT

## 2025-06-25 NOTE — PROGRESS NOTES
940PHYSICAL / OCCUPATIONAL THERAPY - DAILY TREATMENT NOTE    Patient Name: Honey Tirado    Date: 2025    : 1970  Insurance: Payor: KATHRYN LIZ / Plan: [employer] / Product Type: *No Product type* /      Patient  verified Yes     Visit #   Current / Total 21 24   Time   In / Out 940 1020   Pain   In / Out 1 0   Subjective Functional Status/Changes: No problems with using my arm carrying luggage or playing with the kids while out of town,  plane ride was just uncomfortable.     TREATMENT AREA =  Bursitis of right shoulder  Bicipital tendinitis, right shoulder  Right shoulder pain    OBJECTIVE    Modalities Rationale:     decrease inflammation, decrease pain, and increase tissue extensibility to improve patient's ability to progress to PLOF and address remaining functional goals.     min [] Estim Unattended, type/location:                                      []  w/ice    []  w/heat   ND min [x] Estim Attended, type/location:  (R) anterior to posterior of shoulder, continuous, 1.2 MHz                                   [x]  w/US     []  w/ice    []  w/heat    []  TENS insruct      min []  Mechanical Traction: type/lbs                   []  pro   []  sup   []  int   []  cont    []  before manual    []  after manual    min []  Ultrasound, settings/location:      min  unbill [x]  Ice     []  Heat    location/position: (R) shoulder    min []  Paraffin,  details:     min []  Vasopneumatic Device, press/temp:     min []  Whirlpool / Fluido:    If using vaso (only need to measure limb vaso being performed on)      pre-treatment girth :       post-treatment girth :       measured at (landmark location) :      min []  Other:    Skin assessment post-treatment:   Intact      Therapeutic Procedures:    Tx Min Billable or 1:1 Min (if diff from Tx Min) Procedure, Rationale, Specifics   10  06008 Manual Therapy (timed):  decrease pain, increase ROM, and increase tissue extensibility to improve patient's